# Patient Record
Sex: MALE | Race: WHITE | NOT HISPANIC OR LATINO | Employment: OTHER | URBAN - METROPOLITAN AREA
[De-identification: names, ages, dates, MRNs, and addresses within clinical notes are randomized per-mention and may not be internally consistent; named-entity substitution may affect disease eponyms.]

---

## 2017-01-06 ENCOUNTER — APPOINTMENT (OUTPATIENT)
Dept: SLEEP MEDICINE | Facility: HOSPITAL | Age: 72
End: 2017-01-06

## 2017-01-11 RX ORDER — OMEPRAZOLE 40 MG/1
CAPSULE, DELAYED RELEASE ORAL
Qty: 15 CAPSULE | Refills: 0 | Status: SHIPPED | OUTPATIENT
Start: 2017-01-11 | End: 2017-06-15 | Stop reason: SDUPTHER

## 2017-01-27 ENCOUNTER — APPOINTMENT (OUTPATIENT)
Dept: SLEEP MEDICINE | Facility: HOSPITAL | Age: 72
End: 2017-01-27

## 2017-04-10 RX ORDER — FUROSEMIDE 40 MG/1
TABLET ORAL
Qty: 90 TABLET | Refills: 2 | Status: SHIPPED | OUTPATIENT
Start: 2017-04-10 | End: 2017-05-25 | Stop reason: SDUPTHER

## 2017-05-04 RX ORDER — METOPROLOL TARTRATE 50 MG/1
TABLET, FILM COATED ORAL
Qty: 270 TABLET | Refills: 0 | Status: SHIPPED | OUTPATIENT
Start: 2017-05-04 | End: 2017-05-11

## 2017-05-11 ENCOUNTER — OFFICE VISIT (OUTPATIENT)
Dept: CARDIOLOGY | Facility: CLINIC | Age: 72
End: 2017-05-11

## 2017-05-11 VITALS
HEIGHT: 70 IN | WEIGHT: 238 LBS | SYSTOLIC BLOOD PRESSURE: 138 MMHG | HEART RATE: 66 BPM | DIASTOLIC BLOOD PRESSURE: 80 MMHG | BODY MASS INDEX: 34.07 KG/M2 | RESPIRATION RATE: 16 BRPM

## 2017-05-11 DIAGNOSIS — R07.2 PRECORDIAL PAIN: ICD-10-CM

## 2017-05-11 DIAGNOSIS — I49.3 PVC (PREMATURE VENTRICULAR CONTRACTION): ICD-10-CM

## 2017-05-11 DIAGNOSIS — I48.0 PAROXYSMAL ATRIAL FIBRILLATION (HCC): ICD-10-CM

## 2017-05-11 DIAGNOSIS — E78.2 MIXED HYPERLIPIDEMIA: ICD-10-CM

## 2017-05-11 DIAGNOSIS — R06.09 DYSPNEA ON EXERTION: Primary | ICD-10-CM

## 2017-05-11 DIAGNOSIS — I48.3 TYPICAL ATRIAL FLUTTER (HCC): ICD-10-CM

## 2017-05-11 DIAGNOSIS — G47.33 OBSTRUCTIVE SLEEP APNEA: ICD-10-CM

## 2017-05-11 DIAGNOSIS — I10 BENIGN ESSENTIAL HTN: ICD-10-CM

## 2017-05-11 PROCEDURE — 93000 ELECTROCARDIOGRAM COMPLETE: CPT | Performed by: INTERNAL MEDICINE

## 2017-05-11 PROCEDURE — 99214 OFFICE O/P EST MOD 30 MIN: CPT | Performed by: INTERNAL MEDICINE

## 2017-05-11 RX ORDER — METOPROLOL SUCCINATE 50 MG/1
150 TABLET, EXTENDED RELEASE ORAL DAILY
Qty: 270 TABLET | Refills: 3 | Status: SHIPPED | OUTPATIENT
Start: 2017-05-11 | End: 2018-05-07 | Stop reason: SDUPTHER

## 2017-05-12 ENCOUNTER — LAB (OUTPATIENT)
Dept: LAB | Facility: HOSPITAL | Age: 72
End: 2017-05-12

## 2017-05-12 DIAGNOSIS — I48.3 TYPICAL ATRIAL FLUTTER (HCC): ICD-10-CM

## 2017-05-12 DIAGNOSIS — I48.0 PAROXYSMAL ATRIAL FIBRILLATION (HCC): ICD-10-CM

## 2017-05-12 DIAGNOSIS — R06.09 DYSPNEA ON EXERTION: ICD-10-CM

## 2017-05-12 DIAGNOSIS — I10 BENIGN ESSENTIAL HTN: ICD-10-CM

## 2017-05-12 DIAGNOSIS — I49.3 PVC (PREMATURE VENTRICULAR CONTRACTION): ICD-10-CM

## 2017-05-12 DIAGNOSIS — E78.2 MIXED HYPERLIPIDEMIA: ICD-10-CM

## 2017-05-12 LAB
ALBUMIN SERPL-MCNC: 3.8 G/DL (ref 3.5–5.2)
ALBUMIN/GLOB SERPL: 1.2 G/DL
ALP SERPL-CCNC: 113 U/L (ref 39–117)
ALT SERPL W P-5'-P-CCNC: 36 U/L (ref 1–41)
ANION GAP SERPL CALCULATED.3IONS-SCNC: 12.8 MMOL/L
AST SERPL-CCNC: 63 U/L (ref 1–40)
BILIRUB SERPL-MCNC: 0.7 MG/DL (ref 0.1–1.2)
BUN BLD-MCNC: 16 MG/DL (ref 8–23)
BUN/CREAT SERPL: 13 (ref 7–25)
CALCIUM SPEC-SCNC: 9 MG/DL (ref 8.6–10.5)
CHLORIDE SERPL-SCNC: 108 MMOL/L (ref 98–107)
CHOLEST SERPL-MCNC: 140 MG/DL (ref 0–200)
CO2 SERPL-SCNC: 27.2 MMOL/L (ref 22–29)
CREAT BLD-MCNC: 1.23 MG/DL (ref 0.76–1.27)
DEPRECATED RDW RBC AUTO: 45.9 FL (ref 37–54)
ERYTHROCYTE [DISTWIDTH] IN BLOOD BY AUTOMATED COUNT: 13.6 % (ref 11.5–14.5)
GFR SERPL CREATININE-BSD FRML MDRD: 58 ML/MIN/1.73
GLOBULIN UR ELPH-MCNC: 3.2 GM/DL
GLUCOSE BLD-MCNC: 147 MG/DL (ref 65–99)
HCT VFR BLD AUTO: 36.2 % (ref 40.4–52.2)
HDLC SERPL-MCNC: 33 MG/DL (ref 40–60)
HGB BLD-MCNC: 11.9 G/DL (ref 13.7–17.6)
LDLC SERPL CALC-MCNC: 68 MG/DL (ref 0–100)
LDLC/HDLC SERPL: 2.05 {RATIO}
MAGNESIUM SERPL-MCNC: 2 MG/DL (ref 1.6–2.4)
MCH RBC QN AUTO: 30.4 PG (ref 27–32.7)
MCHC RBC AUTO-ENTMCNC: 32.9 G/DL (ref 32.6–36.4)
MCV RBC AUTO: 92.3 FL (ref 79.8–96.2)
PLATELET # BLD AUTO: 170 10*3/MM3 (ref 140–500)
PMV BLD AUTO: 10.6 FL (ref 6–12)
POTASSIUM BLD-SCNC: 4 MMOL/L (ref 3.5–5.2)
PROT SERPL-MCNC: 7 G/DL (ref 6–8.5)
RBC # BLD AUTO: 3.92 10*6/MM3 (ref 4.6–6)
SODIUM BLD-SCNC: 148 MMOL/L (ref 136–145)
T-UPTAKE NFR SERPL: 0.94 TBI (ref 0.8–1.3)
T4 SERPL-MCNC: 5.97 MCG/DL (ref 4.5–11.7)
TRIGL SERPL-MCNC: 197 MG/DL (ref 0–150)
TSH SERPL DL<=0.05 MIU/L-ACNC: 1.76 MIU/ML (ref 0.27–4.2)
VLDLC SERPL-MCNC: 39.4 MG/DL (ref 5–40)
WBC NRBC COR # BLD: 6.68 10*3/MM3 (ref 4.5–10.7)

## 2017-05-12 PROCEDURE — 85027 COMPLETE CBC AUTOMATED: CPT

## 2017-05-12 PROCEDURE — 36415 COLL VENOUS BLD VENIPUNCTURE: CPT

## 2017-05-12 PROCEDURE — 84436 ASSAY OF TOTAL THYROXINE: CPT | Performed by: INTERNAL MEDICINE

## 2017-05-12 PROCEDURE — 80061 LIPID PANEL: CPT | Performed by: INTERNAL MEDICINE

## 2017-05-12 PROCEDURE — 84443 ASSAY THYROID STIM HORMONE: CPT | Performed by: INTERNAL MEDICINE

## 2017-05-12 PROCEDURE — 83735 ASSAY OF MAGNESIUM: CPT

## 2017-05-12 PROCEDURE — 84479 ASSAY OF THYROID (T3 OR T4): CPT | Performed by: INTERNAL MEDICINE

## 2017-05-12 PROCEDURE — 80053 COMPREHEN METABOLIC PANEL: CPT

## 2017-05-25 ENCOUNTER — TELEPHONE (OUTPATIENT)
Dept: CARDIOLOGY | Facility: CLINIC | Age: 72
End: 2017-05-25

## 2017-05-25 ENCOUNTER — HOSPITAL ENCOUNTER (OUTPATIENT)
Dept: CARDIOLOGY | Facility: HOSPITAL | Age: 72
Discharge: HOME | End: 2017-05-25
Attending: INTERNAL MEDICINE | Admitting: INTERNAL MEDICINE

## 2017-05-25 ENCOUNTER — HOSPITAL ENCOUNTER (OUTPATIENT)
Dept: CARDIOLOGY | Facility: HOSPITAL | Age: 72
Discharge: HOME | End: 2017-05-25
Attending: INTERNAL MEDICINE

## 2017-05-25 VITALS
HEART RATE: 74 BPM | WEIGHT: 238 LBS | HEIGHT: 70 IN | BODY MASS INDEX: 34.07 KG/M2 | SYSTOLIC BLOOD PRESSURE: 126 MMHG | OXYGEN SATURATION: 96 % | DIASTOLIC BLOOD PRESSURE: 60 MMHG

## 2017-05-25 DIAGNOSIS — R06.09 DYSPNEA ON EXERTION: ICD-10-CM

## 2017-05-25 DIAGNOSIS — R07.2 PRECORDIAL PAIN: ICD-10-CM

## 2017-05-25 DIAGNOSIS — I49.3 PVC (PREMATURE VENTRICULAR CONTRACTION): ICD-10-CM

## 2017-05-25 LAB
ASCENDING AORTA: 3.2 CM
BH CV ECHO MEAS - ACS: 1.8 CM
BH CV ECHO MEAS - AO MAX PG (FULL): 1.4 MMHG
BH CV ECHO MEAS - AO MAX PG: 9.6 MMHG
BH CV ECHO MEAS - AO MEAN PG (FULL): 1.3 MMHG
BH CV ECHO MEAS - AO MEAN PG: 6.5 MMHG
BH CV ECHO MEAS - AO ROOT AREA (BSA CORRECTED): 1.8
BH CV ECHO MEAS - AO ROOT AREA: 12.3 CM^2
BH CV ECHO MEAS - AO ROOT DIAM: 4 CM
BH CV ECHO MEAS - AO V2 MAX: 154.7 CM/SEC
BH CV ECHO MEAS - AO V2 MEAN: 123.8 CM/SEC
BH CV ECHO MEAS - AO V2 VTI: 36.2 CM
BH CV ECHO MEAS - AVA(I,A): 2.9 CM^2
BH CV ECHO MEAS - AVA(I,D): 2.9 CM^2
BH CV ECHO MEAS - AVA(V,A): 2.9 CM^2
BH CV ECHO MEAS - AVA(V,D): 2.9 CM^2
BH CV ECHO MEAS - BSA(HAYCOCK): 2.3 M^2
BH CV ECHO MEAS - BSA: 2.2 M^2
BH CV ECHO MEAS - BZI_BMI: 34.1 KILOGRAMS/M^2
BH CV ECHO MEAS - BZI_METRIC_HEIGHT: 177.8 CM
BH CV ECHO MEAS - BZI_METRIC_WEIGHT: 108 KG
BH CV ECHO MEAS - CONTRAST EF (2CH): 56.8 ML/M^2
BH CV ECHO MEAS - CONTRAST EF 4CH: 59.3 ML/M^2
BH CV ECHO MEAS - EDV(MOD-SP2): 95 ML
BH CV ECHO MEAS - EDV(MOD-SP4): 91 ML
BH CV ECHO MEAS - EDV(TEICH): 93.3 ML
BH CV ECHO MEAS - EF(CUBED): 75.6 %
BH CV ECHO MEAS - EF(MOD-SP2): 56.8 %
BH CV ECHO MEAS - EF(MOD-SP4): 59.3 %
BH CV ECHO MEAS - EF(TEICH): 67.7 %
BH CV ECHO MEAS - ESV(MOD-SP2): 41 ML
BH CV ECHO MEAS - ESV(MOD-SP4): 37 ML
BH CV ECHO MEAS - ESV(TEICH): 30.2 ML
BH CV ECHO MEAS - FS: 37.5 %
BH CV ECHO MEAS - IVS/LVPW: 1
BH CV ECHO MEAS - IVSD: 1.2 CM
BH CV ECHO MEAS - LAT PEAK E' VEL: 7 CM/SEC
BH CV ECHO MEAS - LV DIASTOLIC VOL/BSA (35-75): 40.5 ML/M^2
BH CV ECHO MEAS - LV MASS(C)D: 197.9 GRAMS
BH CV ECHO MEAS - LV MASS(C)DI: 88.1 GRAMS/M^2
BH CV ECHO MEAS - LV MAX PG: 8.1 MMHG
BH CV ECHO MEAS - LV MEAN PG: 5.2 MMHG
BH CV ECHO MEAS - LV SYSTOLIC VOL/BSA (12-30): 16.5 ML/M^2
BH CV ECHO MEAS - LV V1 MAX: 142.6 CM/SEC
BH CV ECHO MEAS - LV V1 MEAN: 110.9 CM/SEC
BH CV ECHO MEAS - LV V1 VTI: 33.2 CM
BH CV ECHO MEAS - LVIDD: 4.5 CM
BH CV ECHO MEAS - LVIDS: 2.8 CM
BH CV ECHO MEAS - LVLD AP2: 8.6 CM
BH CV ECHO MEAS - LVLD AP4: 8.4 CM
BH CV ECHO MEAS - LVLS AP2: 8.2 CM
BH CV ECHO MEAS - LVLS AP4: 7.4 CM
BH CV ECHO MEAS - LVOT AREA (M): 3.1 CM^2
BH CV ECHO MEAS - LVOT AREA: 3.1 CM^2
BH CV ECHO MEAS - LVOT DIAM: 2 CM
BH CV ECHO MEAS - LVPWD: 1.2 CM
BH CV ECHO MEAS - MED PEAK E' VEL: 7 CM/SEC
BH CV ECHO MEAS - MR MAX PG: 43.2 MMHG
BH CV ECHO MEAS - MR MAX VEL: 328.5 CM/SEC
BH CV ECHO MEAS - MV A DUR: 0.11 SEC
BH CV ECHO MEAS - MV A MAX VEL: 101.4 CM/SEC
BH CV ECHO MEAS - MV DEC SLOPE: 233.4 CM/SEC^2
BH CV ECHO MEAS - MV DEC TIME: 0.29 SEC
BH CV ECHO MEAS - MV E MAX VEL: 67.9 CM/SEC
BH CV ECHO MEAS - MV E/A: 0.67
BH CV ECHO MEAS - MV MAX PG: 3.8 MMHG
BH CV ECHO MEAS - MV MEAN PG: 1.6 MMHG
BH CV ECHO MEAS - MV P1/2T MAX VEL: 69.4 CM/SEC
BH CV ECHO MEAS - MV P1/2T: 87.1 MSEC
BH CV ECHO MEAS - MV V2 MAX: 97.7 CM/SEC
BH CV ECHO MEAS - MV V2 MEAN: 59.7 CM/SEC
BH CV ECHO MEAS - MV V2 VTI: 30.3 CM
BH CV ECHO MEAS - MVA P1/2T LCG: 3.2 CM^2
BH CV ECHO MEAS - MVA(P1/2T): 2.5 CM^2
BH CV ECHO MEAS - MVA(VTI): 3.4 CM^2
BH CV ECHO MEAS - PA ACC TIME: 0.08 SEC
BH CV ECHO MEAS - PA MAX PG (FULL): 3.7 MMHG
BH CV ECHO MEAS - PA MAX PG: 5.7 MMHG
BH CV ECHO MEAS - PA PR(ACCEL): 42.6 MMHG
BH CV ECHO MEAS - PA V2 MAX: 119.4 CM/SEC
BH CV ECHO MEAS - PULM A REVS DUR: 0.11 SEC
BH CV ECHO MEAS - PULM A REVS VEL: 28.8 CM/SEC
BH CV ECHO MEAS - PULM DIAS VEL: 39.2 CM/SEC
BH CV ECHO MEAS - PULM S/D: 1.2
BH CV ECHO MEAS - PULM SYS VEL: 46.2 CM/SEC
BH CV ECHO MEAS - PVA(V,A): 2.2 CM^2
BH CV ECHO MEAS - PVA(V,D): 2.2 CM^2
BH CV ECHO MEAS - QP/QS: 0.69
BH CV ECHO MEAS - RAP SYSTOLE: 3 MMHG
BH CV ECHO MEAS - RV MAX PG: 2 MMHG
BH CV ECHO MEAS - RV MEAN PG: 1.2 MMHG
BH CV ECHO MEAS - RV V1 MAX: 70.8 CM/SEC
BH CV ECHO MEAS - RV V1 MEAN: 52 CM/SEC
BH CV ECHO MEAS - RV V1 VTI: 19 CM
BH CV ECHO MEAS - RVOT AREA: 3.7 CM^2
BH CV ECHO MEAS - RVOT DIAM: 2.2 CM
BH CV ECHO MEAS - RVSP: 29.9 MMHG
BH CV ECHO MEAS - SI(AO): 197.5 ML/M^2
BH CV ECHO MEAS - SI(CUBED): 31 ML/M^2
BH CV ECHO MEAS - SI(LVOT): 46 ML/M^2
BH CV ECHO MEAS - SI(MOD-SP2): 24 ML/M^2
BH CV ECHO MEAS - SI(MOD-SP4): 24 ML/M^2
BH CV ECHO MEAS - SI(TEICH): 28.1 ML/M^2
BH CV ECHO MEAS - SUP REN AO DIAM: 1.7 CM
BH CV ECHO MEAS - SV(AO): 443.9 ML
BH CV ECHO MEAS - SV(CUBED): 69.7 ML
BH CV ECHO MEAS - SV(LVOT): 103.5 ML
BH CV ECHO MEAS - SV(MOD-SP2): 54 ML
BH CV ECHO MEAS - SV(MOD-SP4): 54 ML
BH CV ECHO MEAS - SV(RVOT): 71.4 ML
BH CV ECHO MEAS - SV(TEICH): 63.1 ML
BH CV ECHO MEAS - TAPSE (>1.6): 2.5 CM2
BH CV ECHO MEAS - TR MAX VEL: 259.3 CM/SEC
BH CV NUCLEAR PRIOR STUDY: 3
BH CV STRESS BP STAGE 1: NORMAL
BH CV STRESS DURATION MIN STAGE 1: 3
BH CV STRESS DURATION MIN STAGE 2: 1
BH CV STRESS DURATION SEC STAGE 1: 0
BH CV STRESS DURATION SEC STAGE 2: 45
BH CV STRESS GRADE STAGE 1: 10
BH CV STRESS GRADE STAGE 2: 12
BH CV STRESS HR STAGE 1: 99
BH CV STRESS HR STAGE 2: 110
BH CV STRESS METS STAGE 1: 5
BH CV STRESS METS STAGE 2: 7.5
BH CV STRESS PROTOCOL 1: NORMAL
BH CV STRESS PROTOCOL 2 BP STAGE 1: NORMAL
BH CV STRESS PROTOCOL 2 COMMENTS STAGE 1: NORMAL
BH CV STRESS PROTOCOL 2 DOSE REGADENOSON STAGE 1: 0.4
BH CV STRESS PROTOCOL 2 DURATION MIN STAGE 1: 0
BH CV STRESS PROTOCOL 2 DURATION SEC STAGE 1: 15
BH CV STRESS PROTOCOL 2 HR STAGE 1: 117
BH CV STRESS PROTOCOL 2 STAGE 1: 1
BH CV STRESS PROTOCOL 2: NORMAL
BH CV STRESS RECOVERY BP: NORMAL MMHG
BH CV STRESS RECOVERY HR: 90 BPM
BH CV STRESS SPEED STAGE 1: 1.7
BH CV STRESS SPEED STAGE 2: 2.5
BH CV STRESS STAGE 1: 1
BH CV STRESS STAGE 2: 2
BH CV XLRA - RV BASE: 3 CM
BH CV XLRA - TDI S': 22 CM/SEC
E/E' RATIO: 10
LEFT ATRIUM VOLUME INDEX: 28 ML/M2
LV EF NUC BP: 67 %
MAXIMAL PREDICTED HEART RATE: 150 BPM
PERCENT MAX PREDICTED HR: 78 %
SINUS: 3.4 CM
STJ: 2.9 CM
STRESS BASELINE BP: NORMAL MMHG
STRESS BASELINE HR: 66 BPM
STRESS PERCENT HR: 92 %
STRESS POST PEAK BP: NORMAL MMHG
STRESS POST PEAK HR: 117 BPM
STRESS TARGET HR: 128 BPM

## 2017-05-25 PROCEDURE — 25010000002 PERFLUTREN (DEFINITY) 8.476 MG IN SODIUM CHLORIDE 10 ML INJECTION: Performed by: INTERNAL MEDICINE

## 2017-05-25 PROCEDURE — A9502 TC99M TETROFOSMIN: HCPCS | Performed by: INTERNAL MEDICINE

## 2017-05-25 PROCEDURE — 0 TECHNETIUM TETROFOSMIN KIT: Performed by: INTERNAL MEDICINE

## 2017-05-25 PROCEDURE — 93306 TTE W/DOPPLER COMPLETE: CPT | Performed by: INTERNAL MEDICINE

## 2017-05-25 PROCEDURE — 78452 HT MUSCLE IMAGE SPECT MULT: CPT

## 2017-05-25 PROCEDURE — 93016 CV STRESS TEST SUPVJ ONLY: CPT | Performed by: INTERNAL MEDICINE

## 2017-05-25 PROCEDURE — C8929 TTE W OR WO FOL WCON,DOPPLER: HCPCS

## 2017-05-25 PROCEDURE — 78452 HT MUSCLE IMAGE SPECT MULT: CPT | Performed by: INTERNAL MEDICINE

## 2017-05-25 PROCEDURE — 93017 CV STRESS TEST TRACING ONLY: CPT

## 2017-05-25 PROCEDURE — 25010000002 REGADENOSON 0.4 MG/5ML SOLUTION: Performed by: INTERNAL MEDICINE

## 2017-05-25 PROCEDURE — 93018 CV STRESS TEST I&R ONLY: CPT | Performed by: INTERNAL MEDICINE

## 2017-05-25 RX ADMIN — REGADENOSON 0.4 MG: 0.08 INJECTION, SOLUTION INTRAVENOUS at 09:35

## 2017-05-25 RX ADMIN — TETROFOSMIN 1 DOSE: 1.38 INJECTION, POWDER, LYOPHILIZED, FOR SOLUTION INTRAVENOUS at 08:30

## 2017-05-25 RX ADMIN — TETROFOSMIN 1 DOSE: 1.38 INJECTION, POWDER, LYOPHILIZED, FOR SOLUTION INTRAVENOUS at 09:35

## 2017-05-25 RX ADMIN — PERFLUTREN 1.5 ML: 6.52 INJECTION, SUSPENSION INTRAVENOUS at 09:14

## 2017-06-05 RX ORDER — APIXABAN 5 MG/1
TABLET, FILM COATED ORAL
Qty: 180 TABLET | Refills: 2 | Status: SHIPPED | OUTPATIENT
Start: 2017-06-05 | End: 2017-06-16 | Stop reason: SDUPTHER

## 2017-06-12 RX ORDER — OMEPRAZOLE 40 MG/1
CAPSULE, DELAYED RELEASE ORAL
Qty: 15 CAPSULE | OUTPATIENT
Start: 2017-06-12

## 2017-06-13 ENCOUNTER — OFFICE VISIT (OUTPATIENT)
Dept: FAMILY MEDICINE CLINIC | Facility: CLINIC | Age: 72
End: 2017-06-13

## 2017-06-13 VITALS
WEIGHT: 236.8 LBS | OXYGEN SATURATION: 98 % | HEART RATE: 59 BPM | DIASTOLIC BLOOD PRESSURE: 80 MMHG | SYSTOLIC BLOOD PRESSURE: 142 MMHG | BODY MASS INDEX: 33.98 KG/M2

## 2017-06-13 DIAGNOSIS — K21.9 GASTROESOPHAGEAL REFLUX DISEASE WITHOUT ESOPHAGITIS: Primary | ICD-10-CM

## 2017-06-13 DIAGNOSIS — H61.23 BILATERAL IMPACTED CERUMEN: ICD-10-CM

## 2017-06-13 PROCEDURE — 99214 OFFICE O/P EST MOD 30 MIN: CPT | Performed by: NURSE PRACTITIONER

## 2017-06-13 NOTE — PROGRESS NOTES
Henri Santana is a 72 y.o. male.  Seen 06/13/2017    Assessment/Plan   Problem List Items Addressed This Visit     None             No Follow-up on file.  There are no Patient Instructions on file for this visit.    Vitals:    06/13/17 1005   BP: 142/80   Pulse: 59   SpO2: 98%   Weight: 236 lb 12.8 oz (107 kg)     Body mass index is 33.98 kg/(m^2).    Subjective     Chief Complaint   Patient presents with   • Med Refill     Omeparzole   • Ear Fullness     States that he went to [facility] a couple of weeks ago and they told him that he needed his ears cleaned out   Pt of Dr. Estrada and is here for a cerumen impaction.  Was seen at the [facility] a couple of weeks ago and told he couldn't have it cleaned out there and needed to go see his primary provider. He is having trouble hearing.    Also takes omeprazole for his gerd and is stable. He is requesting a refill.  Social History   Substance Use Topics   • Smoking status: Former Smoker     Packs/day: 1.00     Years: 30.00     Types: Cigarettes     Quit date: 1967   • Smokeless tobacco: Never Used   • Alcohol use Yes      Comment: social       History of Present Illness     The following portions of the patient's history were reviewed and updated as appropriate:PMHroutine: Social history , Allergies and Current Medications    Review of Systems   HENT: Positive for hearing loss. Negative for ear discharge and ear pain.         Ear fullness   Respiratory: Positive for cough.    All other systems reviewed and are negative.      Objective   Physical Exam   Constitutional: He appears well-developed and well-nourished. No distress.   HENT:   Bilateral cerumen impaction   Eyes: EOM are normal.   Pulmonary/Chest: Effort normal.   Musculoskeletal: Normal range of motion.   Neurological: He is alert.   Skin: Skin is warm.   Nursing note and vitals reviewed.    Reviewed Data:  Hospital Outpatient Visit on 05/25/2017   Component Date Value Ref Range Status   • Nuclear Prior Study 05/25/2017 3    Final   • Target HR (85%) 05/25/2017 128  bpm Final   • Max. Pred. HR (100%) 05/25/2017 150  bpm Final   • BH CV STRESS PROTOCOL 1 05/25/2017 Alex   Final   • Stage 1 05/25/2017 1   Final   • HR Stage 1 05/25/2017 99   Final   • BP Stage 1 05/25/2017 130/60   Final   • Duration Min Stage 1 05/25/2017 3   Final   • Duration Sec Stage 1 05/25/2017 0   Final   • Grade Stage 1 05/25/2017 10   Final   • Speed Stage 1 05/25/2017 1.7   Final   • BH CV STRESS METS STAGE 1 05/25/2017 5   Final   • Stage 2 05/25/2017 2   Final   • HR Stage 2 05/25/2017 110   Final   • Duration Min Stage 2 05/25/2017 1   Final   • Duration Sec Stage 2 05/25/2017 45   Final   • Grade Stage 2 05/25/2017 12   Final   • Speed Stage 2 05/25/2017 2.5   Final   • BH CV STRESS METS STAGE 2 05/25/2017 7.5   Final   • Baseline HR 05/25/2017 66  bpm Final   • Baseline BP 05/25/2017 126/52  mmHg Final   • Peak HR 05/25/2017 117  bpm Final   • Percent Max Pred HR 05/25/2017 78.00  % Final   • Percent Target HR 05/25/2017 92  % Final   • Peak BP 05/25/2017 148/76  mmHg Final   • Recovery HR 05/25/2017 90  bpm Final   • Recovery BP 05/25/2017 150/64  mmHg Final   • BH CV STRESS PROTOCOL 2 05/25/2017 Pharmacologic   Final   • BH CV STRESS PROTOCOL 2 STAGE 1 05/25/2017 1   Final   • BH CV STRESS PROTOCOL 2 HR STAGE 1 05/25/2017 117   Final   • BH CV STRESS PROTOCOL 2 BP STAGE 1 05/25/2017 148/76   Final   • BH CV STRESS PROTOCOL 2 DURATION M* 05/25/2017 0   Final   • BH CV STRESS PROTOCOL 2 DURATION S* 05/25/2017 15   Final   • BH CV STRESS PROTOCOL 2 DOSE REGAD* 05/25/2017 0.4   Final   • BH CV STRESS PROTOCOL 2 COMMENTS S* 05/25/2017 15 sec bolus injection   Final   • Nuc Stress EF 05/25/2017 67  % Final   Hospital Outpatient Visit on 05/25/2017   Component Date Value Ref Range Status   • BSA 05/25/2017 2.2  m^2 Preliminary   • IVSd 05/25/2017 1.2  cm Preliminary   • LVIDd 05/25/2017 4.5  cm Preliminary   • LVIDs 05/25/2017 2.8  cm Preliminary   • LVPWd  05/25/2017 1.2  cm Preliminary   • IVS/LVPW 05/25/2017 1.0   Preliminary   • FS 05/25/2017 37.5  % Preliminary   • EDV(Teich) 05/25/2017 93.3  ml Preliminary   • ESV(Teich) 05/25/2017 30.2  ml Preliminary   • EF(Teich) 05/25/2017 67.7  % Preliminary   • EF(cubed) 05/25/2017 75.6  % Preliminary   • LV mass(C)d 05/25/2017 197.9  grams Preliminary   • LV mass(C)dI 05/25/2017 88.1  grams/m^2 Preliminary   • SV(Teich) 05/25/2017 63.1  ml Preliminary   • SI(Teich) 05/25/2017 28.1  ml/m^2 Preliminary   • SV(cubed) 05/25/2017 69.7  ml Preliminary   • SI(cubed) 05/25/2017 31.0  ml/m^2 Preliminary   • Ao root diam 05/25/2017 4.0  cm Preliminary   • Ao root area 05/25/2017 12.3  cm^2 Preliminary   • ACS 05/25/2017 1.8  cm Preliminary   • LVOT diam 05/25/2017 2.0  cm Preliminary   • LVOT area 05/25/2017 3.1  cm^2 Preliminary   • LVOT area(traced) 05/25/2017 3.1  cm^2 Preliminary   • RVOT diam 05/25/2017 2.2  cm Preliminary   • RVOT area 05/25/2017 3.7  cm^2 Preliminary   • LVLd ap4 05/25/2017 8.4  cm Preliminary   • EDV(MOD-sp4) 05/25/2017 91.0  ml Preliminary   • LVLs ap4 05/25/2017 7.4  cm Preliminary   • ESV(MOD-sp4) 05/25/2017 37.0  ml Preliminary   • EF(MOD-sp4) 05/25/2017 59.3  % Preliminary   • LVLd ap2 05/25/2017 8.6  cm Preliminary   • EDV(MOD-sp2) 05/25/2017 95.0  ml Preliminary   • LVLs ap2 05/25/2017 8.2  cm Preliminary   • ESV(MOD-sp2) 05/25/2017 41.0  ml Preliminary   • EF(MOD-sp2) 05/25/2017 56.8  % Preliminary   • SV(MOD-sp4) 05/25/2017 54.0  ml Preliminary   • SI(MOD-sp4) 05/25/2017 24.0  ml/m^2 Preliminary   • SV(MOD-sp2) 05/25/2017 54.0  ml Preliminary   • SI(MOD-sp2) 05/25/2017 24.0  ml/m^2 Preliminary   • Ao root area (BSA corrected) 05/25/2017 1.8   Preliminary   • Ao root area (BSA corrected) 05/25/2017 56.8  ml/m^2 Preliminary   • CONTRAST EF 4CH 05/25/2017 59.3  ml/m^2 Preliminary   • LV Diastolic corrected for BSA 05/25/2017 40.5  ml/m^2 Preliminary   • LV Systolic corrected for BSA 05/25/2017 16.5   ml/m^2 Preliminary   • MV A dur 05/25/2017 0.11  sec Preliminary   • MV E max horace 05/25/2017 67.9  cm/sec Preliminary   • MV A max horace 05/25/2017 101.4  cm/sec Preliminary   • MV E/A 05/25/2017 0.67   Preliminary   • MV V2 max 05/25/2017 97.7  cm/sec Preliminary   • MV max PG 05/25/2017 3.8  mmHg Preliminary   • MV V2 mean 05/25/2017 59.7  cm/sec Preliminary   • MV mean PG 05/25/2017 1.6  mmHg Preliminary   • MV V2 VTI 05/25/2017 30.3  cm Preliminary   • MVA(VTI) 05/25/2017 3.4  cm^2 Preliminary   • MV P1/2t max horace 05/25/2017 69.4  cm/sec Preliminary   • MV P1/2t 05/25/2017 87.1  msec Preliminary   • MVA(P1/2t) 05/25/2017 2.5  cm^2 Preliminary   • MV dec slope 05/25/2017 233.4  cm/sec^2 Preliminary   • MV dec time 05/25/2017 0.29  sec Preliminary   • Ao pk horace 05/25/2017 154.7  cm/sec Preliminary   • Ao max PG 05/25/2017 9.6  mmHg Preliminary   • Ao max PG (full) 05/25/2017 1.4  mmHg Preliminary   • Ao V2 mean 05/25/2017 123.8  cm/sec Preliminary   • Ao mean PG 05/25/2017 6.5  mmHg Preliminary   • Ao mean PG (full) 05/25/2017 1.3  mmHg Preliminary   • Ao V2 VTI 05/25/2017 36.2  cm Preliminary   • VINNIE(I,A) 05/25/2017 2.9  cm^2 Preliminary   • VINNIE(I,D) 05/25/2017 2.9  cm^2 Preliminary   • VINNIE(V,A) 05/25/2017 2.9  cm^2 Preliminary   • IVNNIE(V,D) 05/25/2017 2.9  cm^2 Preliminary   • LV V1 max PG 05/25/2017 8.1  mmHg Preliminary   • LV V1 mean PG 05/25/2017 5.2  mmHg Preliminary   • LV V1 max 05/25/2017 142.6  cm/sec Preliminary   • LV V1 mean 05/25/2017 110.9  cm/sec Preliminary   • LV V1 VTI 05/25/2017 33.2  cm Preliminary   • MR max horace 05/25/2017 328.5  cm/sec Preliminary   • MR max PG 05/25/2017 43.2  mmHg Preliminary   • SV(Ao) 05/25/2017 443.9  ml Preliminary   • SI(Ao) 05/25/2017 197.5  ml/m^2 Preliminary   • SV(LVOT) 05/25/2017 103.5  ml Preliminary   • SV(RVOT) 05/25/2017 71.4  ml Preliminary   • SI(LVOT) 05/25/2017 46.0  ml/m^2 Preliminary   • PA V2 max 05/25/2017 119.4  cm/sec Preliminary   • PA max PG  05/25/2017 5.7  mmHg Preliminary   • PA max PG (full) 05/25/2017 3.7  mmHg Preliminary   • BH CV ECHO JACOBY - PVA(V,A) 05/25/2017 2.2  cm^2 Preliminary   • BH CV ECHO JACOBY - PVA(V,D) 05/25/2017 2.2  cm^2 Preliminary   • PA acc time 05/25/2017 0.08  sec Preliminary   • RV V1 max PG 05/25/2017 2.0  mmHg Preliminary   • RV V1 mean PG 05/25/2017 1.2  mmHg Preliminary   • RV V1 max 05/25/2017 70.8  cm/sec Preliminary   • RV V1 mean 05/25/2017 52.0  cm/sec Preliminary   • RV V1 VTI 05/25/2017 19.0  cm Preliminary   • TR max dada 05/25/2017 259.3  cm/sec Preliminary   • RVSP(TR) 05/25/2017 29.9  mmHg Preliminary   • RAP systole 05/25/2017 3.0  mmHg Preliminary   • PA pr(Accel) 05/25/2017 42.6  mmHg Preliminary   • Pulm Sys Dada 05/25/2017 46.2  cm/sec Preliminary   • Pulm Quiroz Dada 05/25/2017 39.2  cm/sec Preliminary   • Pulm S/D 05/25/2017 1.2   Preliminary   • Qp/Qs 05/25/2017 0.69   Preliminary   • Pulm A Revs Dur 05/25/2017 0.11  sec Preliminary   • Pulm A Revs Dada 05/25/2017 28.8  cm/sec Preliminary   • MVA P1/2T LCG 05/25/2017 3.2  cm^2 Preliminary   • BH CV ECHO JACOBY - BZI_BMI 05/25/2017 34.1  kilograms/m^2 Preliminary   • BH CV ECHO JACOBY - BSA(HAYCOCK) 05/25/2017 2.3  m^2 Preliminary   • BH CV ECHO JACOBY - BZI_METRIC_WEIGHT 05/25/2017 108.0  kg Preliminary   • BH CV ECHO JACOBY - BZI_METRIC_HEIGHT 05/25/2017 177.8  cm Preliminary   • TDI S' 05/25/2017 22.00  cm/sec Final   • RV Base 05/25/2017 3.00  cm Final   • Sinus 05/25/2017 3.40  cm Final   • STJ 05/25/2017 2.90  cm Final   • Ascending aorta 05/25/2017 3.20  cm Final   • E/E' ratio 05/25/2017 10.0   Final   • LA Volume Index 05/25/2017 28.0  mL/m2 Final   • Lat Peak E' Dada 05/25/2017 7.0  cm/sec Final   • Med Peak E' Dada 05/25/2017 7.00  cm/sec Final   • Sup Roly Ao Diam 05/25/2017 1.70  cm Final   • TAPSE (>1.6) 05/25/2017 2.50  cm2 Final       S/p ear irrigation rt ear clear, is going to return for another ear irrigation in a couple of days after using a couple days  of ear drops to loosen the cerumen.

## 2017-06-15 ENCOUNTER — CLINICAL SUPPORT (OUTPATIENT)
Dept: FAMILY MEDICINE CLINIC | Facility: CLINIC | Age: 72
End: 2017-06-15

## 2017-06-15 ENCOUNTER — TELEPHONE (OUTPATIENT)
Dept: FAMILY MEDICINE CLINIC | Facility: CLINIC | Age: 72
End: 2017-06-15

## 2017-06-15 DIAGNOSIS — H61.22 IMPACTED CERUMEN, LEFT EAR: ICD-10-CM

## 2017-06-15 PROCEDURE — 69209 REMOVE IMPACTED EAR WAX UNI: CPT | Performed by: NURSE PRACTITIONER

## 2017-06-15 RX ORDER — OMEPRAZOLE 40 MG/1
40 CAPSULE, DELAYED RELEASE ORAL DAILY
Qty: 90 CAPSULE | Refills: 1 | Status: SHIPPED | OUTPATIENT
Start: 2017-06-15 | End: 2017-06-15 | Stop reason: SDUPTHER

## 2017-06-15 RX ORDER — OMEPRAZOLE 40 MG/1
40 CAPSULE, DELAYED RELEASE ORAL DAILY
Qty: 90 CAPSULE | Refills: 1 | Status: SHIPPED | OUTPATIENT
Start: 2017-06-15 | End: 2017-12-20 | Stop reason: SDUPTHER

## 2017-06-15 NOTE — TELEPHONE ENCOUNTER
Pt's omeprazole was canceled. Pt was told prior to making appt for ear cleaning that he needed to be seen by Dr. Estrada for medication refills. Pt said he would make appointment and has not done so. Pt has not been seen since 2015.

## 2017-06-16 ENCOUNTER — TELEPHONE (OUTPATIENT)
Dept: CARDIOLOGY | Facility: CLINIC | Age: 72
End: 2017-06-16

## 2017-06-16 NOTE — TELEPHONE ENCOUNTER
Pt called and said that [facility] was denying his eliquis. I spoke with the pharmacy/[facility], and the diagnosis was missing from the ePA, once that was answered it was approved.  [id number]  Back dated to 5- through 6/16/2018

## 2017-10-09 ENCOUNTER — OFFICE VISIT (OUTPATIENT)
Dept: FAMILY MEDICINE CLINIC | Facility: CLINIC | Age: 72
End: 2017-10-09

## 2017-10-09 VITALS
RESPIRATION RATE: 14 BRPM | TEMPERATURE: 98.3 F | SYSTOLIC BLOOD PRESSURE: 130 MMHG | HEART RATE: 64 BPM | OXYGEN SATURATION: 99 % | BODY MASS INDEX: 33.36 KG/M2 | DIASTOLIC BLOOD PRESSURE: 64 MMHG | WEIGHT: 233 LBS | HEIGHT: 70 IN

## 2017-10-09 DIAGNOSIS — I48.91 ATRIAL FIBRILLATION, UNSPECIFIED TYPE (HCC): ICD-10-CM

## 2017-10-09 DIAGNOSIS — E78.2 MIXED HYPERLIPIDEMIA: ICD-10-CM

## 2017-10-09 DIAGNOSIS — C61 CA OF PROSTATE (HCC): Primary | ICD-10-CM

## 2017-10-09 DIAGNOSIS — D64.9 ANEMIA, UNSPECIFIED TYPE: ICD-10-CM

## 2017-10-09 DIAGNOSIS — R73.9 BLOOD GLUCOSE ELEVATED: ICD-10-CM

## 2017-10-09 PROCEDURE — 99214 OFFICE O/P EST MOD 30 MIN: CPT | Performed by: FAMILY MEDICINE

## 2017-10-09 NOTE — PROGRESS NOTES
Subjective   Henri Santana is a 72 y.o. male.     Chief Complaint   Patient presents with   • Establish Care     Patient needs to establish a care.    • Elevated PSA     Patient needs to check on his PSA level and blood value.    • Nose Bleed     Patient gets bleeding left side of nose .        HPI     Patient is here to discuss some problems that are new to me.  Patient states he has history of prostate cancer, elevated blood glucose, hyperlipidemia, and anemia.  He is in need of blood work to evaluate these chronic problems.  He does have a past medical history of colon cancer and resection.  Patient did have a colectomy.  He currently is taking and tolerating Eliquis for A. fib.  He's also taking Lasix, omeprazole, metoprolol, and iron.    The following portions of the patient's history were reviewed and updated as appropriate: allergies, current medications, past family history, past medical history, past social history, past surgical history and problem list.    Review of Systems   Constitutional: Negative for activity change.   All other systems reviewed and are negative.      Objective  Vitals:    10/09/17 1236   BP: 130/64   Pulse: 64   Resp: 14   Temp: 98.3 °F (36.8 °C)   SpO2: 99%        Physical Exam   Constitutional: He is oriented to person, place, and time. He appears well-developed and well-nourished. No distress.   HENT:   Head: Normocephalic and atraumatic.   Right Ear: External ear normal.   Left Ear: External ear normal.   Nose: Nose normal.   Mouth/Throat: Oropharynx is clear and moist.   Eyes: Conjunctivae and EOM are normal. Pupils are equal, round, and reactive to light. Right eye exhibits no discharge. Left eye exhibits no discharge. No scleral icterus.   Neck: Normal range of motion. Neck supple.   Cardiovascular: Normal rate, regular rhythm and normal heart sounds.  Exam reveals no friction rub.    No murmur heard.  Pulmonary/Chest: Effort normal and breath sounds normal. No respiratory  distress. He has no wheezes. He has no rales.   Abdominal: Soft. Bowel sounds are normal. He exhibits no distension. There is no tenderness. There is no rebound and no guarding.   Lymphadenopathy:     He has no cervical adenopathy.   Neurological: He is alert and oriented to person, place, and time.   Skin: Skin is warm and dry. He is not diaphoretic.   Nursing note and vitals reviewed.        Current Outpatient Prescriptions:   •  apixaban (ELIQUIS) 5 MG tablet tablet, Take 1 tablet by mouth Every 12 (Twelve) Hours., Disp: 60 tablet, Rfl: 0  •  ferrous sulfate 325 (65 FE) MG tablet, TAKE 1 TABLET DAILY WITH BREAKFAST, Disp: 30 tablet, Rfl: 3  •  furosemide (LASIX) 40 MG tablet, Take 40 mg by mouth daily., Disp: , Rfl:   •  metoprolol succinate XL (TOPROL-XL) 50 MG 24 hr tablet, Take 3 tablets by mouth Daily., Disp: 270 tablet, Rfl: 3  •  omeprazole (priLOSEC) 40 MG capsule, Take 1 capsule by mouth Daily., Disp: 90 capsule, Rfl: 1  •  potassium gluconate (EQL POTASSIUM GLUCONATE) 595 MG tablet tablet, Take 595 mg by mouth daily., Disp: , Rfl:   •  simethicone (MYLICON) 80 MG chewable tablet, Chew 80 mg every 6 (six) hours as needed for flatulence., Disp: , Rfl:   •  albuterol (PROVENTIL HFA;VENTOLIN HFA) 108 (90 BASE) MCG/ACT inhaler, Inhale 2 puffs Every 4 (Four) Hours As Needed for wheezing., Disp: 1 inhaler, Rfl: 0  •  carbamide peroxide (DEBROX) 6.5 % otic solution, Administer 5 drops to the right ear 2 (Two) Times a Day., Disp: 22 mL, Rfl: 0    Procedures    Lab Results (most recent)     None          Assessment/Plan   Henri was seen today for establish care, elevated psa and nose bleed.    Diagnoses and all orders for this visit:    CA of prostate  -     PSA    Blood glucose elevated  -     Comprehensive Metabolic Panel  -     Hemoglobin A1c    Mixed hyperlipidemia  -     Lipid Panel    Anemia, unspecified type  -     CBC Auto Differential    Atrial fibrillation, unspecified type    Extensive conversation and  chart review done regarding the patient's past medical history.  We'll get labs as above.  We'll adjust treatment plan based on those results.      Return in about 3 months (around 1/9/2018) for Recheck.      Loki Diane MD

## 2017-10-10 LAB
ALBUMIN SERPL-MCNC: 4.4 G/DL (ref 3.5–5.2)
ALBUMIN/GLOB SERPL: 1.4 G/DL
ALP SERPL-CCNC: 126 U/L (ref 39–117)
ALT SERPL-CCNC: 36 U/L (ref 1–41)
AST SERPL-CCNC: 53 U/L (ref 1–40)
BASOPHILS # BLD AUTO: 0.05 10*3/MM3 (ref 0–0.2)
BASOPHILS NFR BLD AUTO: 0.5 % (ref 0–1.5)
BILIRUB SERPL-MCNC: 0.9 MG/DL (ref 0.1–1.2)
BUN SERPL-MCNC: 18 MG/DL (ref 8–23)
BUN/CREAT SERPL: 13.4 (ref 7–25)
CALCIUM SERPL-MCNC: 9.9 MG/DL (ref 8.6–10.5)
CHLORIDE SERPL-SCNC: 102 MMOL/L (ref 98–107)
CHOLEST SERPL-MCNC: 148 MG/DL (ref 0–200)
CO2 SERPL-SCNC: 25.5 MMOL/L (ref 22–29)
CREAT SERPL-MCNC: 1.34 MG/DL (ref 0.76–1.27)
EOSINOPHIL # BLD AUTO: 0.16 10*3/MM3 (ref 0–0.7)
EOSINOPHIL NFR BLD AUTO: 1.7 % (ref 0.3–6.2)
ERYTHROCYTE [DISTWIDTH] IN BLOOD BY AUTOMATED COUNT: 13.8 % (ref 11.5–14.5)
GLOBULIN SER CALC-MCNC: 3.2 GM/DL
GLUCOSE SERPL-MCNC: 122 MG/DL (ref 65–99)
HBA1C MFR BLD: 5.4 % (ref 4.8–5.6)
HCT VFR BLD AUTO: 36 % (ref 40.4–52.2)
HDLC SERPL-MCNC: 31 MG/DL (ref 40–60)
HGB BLD-MCNC: 11.5 G/DL (ref 13.7–17.6)
IMM GRANULOCYTES # BLD: 0.04 10*3/MM3 (ref 0–0.03)
IMM GRANULOCYTES NFR BLD: 0.4 % (ref 0–0.5)
LDLC SERPL CALC-MCNC: 48 MG/DL (ref 0–100)
LYMPHOCYTES # BLD AUTO: 1.76 10*3/MM3 (ref 0.9–4.8)
LYMPHOCYTES NFR BLD AUTO: 19 % (ref 19.6–45.3)
MCH RBC QN AUTO: 29.2 PG (ref 27–32.7)
MCHC RBC AUTO-ENTMCNC: 31.9 G/DL (ref 32.6–36.4)
MCV RBC AUTO: 91.4 FL (ref 79.8–96.2)
MONOCYTES # BLD AUTO: 0.67 10*3/MM3 (ref 0.2–1.2)
MONOCYTES NFR BLD AUTO: 7.3 % (ref 5–12)
NEUTROPHILS # BLD AUTO: 6.56 10*3/MM3 (ref 1.9–8.1)
NEUTROPHILS NFR BLD AUTO: 71.1 % (ref 42.7–76)
PLATELET # BLD AUTO: 258 10*3/MM3 (ref 140–500)
POTASSIUM SERPL-SCNC: 4 MMOL/L (ref 3.5–5.2)
PROT SERPL-MCNC: 7.6 G/DL (ref 6–8.5)
PSA SERPL-MCNC: <0.014 NG/ML (ref 0–4)
RBC # BLD AUTO: 3.94 10*6/MM3 (ref 4.6–6)
SODIUM SERPL-SCNC: 142 MMOL/L (ref 136–145)
TRIGL SERPL-MCNC: 347 MG/DL (ref 0–150)
VLDLC SERPL CALC-MCNC: 69.4 MG/DL (ref 5–40)
WBC # BLD AUTO: 9.24 10*3/MM3 (ref 4.5–10.7)

## 2017-12-20 RX ORDER — OMEPRAZOLE 40 MG/1
40 CAPSULE, DELAYED RELEASE ORAL DAILY
Qty: 90 CAPSULE | Refills: 3 | Status: SHIPPED | OUTPATIENT
Start: 2017-12-20 | End: 2018-12-17 | Stop reason: SDUPTHER

## 2017-12-20 RX ORDER — OMEPRAZOLE 40 MG/1
CAPSULE, DELAYED RELEASE ORAL
Qty: 90 CAPSULE | Refills: 1 | OUTPATIENT
Start: 2017-12-20

## 2018-01-05 RX ORDER — FUROSEMIDE 40 MG/1
TABLET ORAL
Qty: 90 TABLET | Refills: 2 | Status: SHIPPED | OUTPATIENT
Start: 2018-01-05 | End: 2018-10-02 | Stop reason: SDUPTHER

## 2018-01-09 ENCOUNTER — OFFICE VISIT (OUTPATIENT)
Dept: FAMILY MEDICINE CLINIC | Facility: CLINIC | Age: 73
End: 2018-01-09

## 2018-01-09 VITALS
DIASTOLIC BLOOD PRESSURE: 68 MMHG | TEMPERATURE: 98.3 F | OXYGEN SATURATION: 97 % | BODY MASS INDEX: 33.86 KG/M2 | SYSTOLIC BLOOD PRESSURE: 128 MMHG | HEIGHT: 70 IN | HEART RATE: 75 BPM | RESPIRATION RATE: 14 BRPM | WEIGHT: 236.5 LBS

## 2018-01-09 DIAGNOSIS — R73.9 BLOOD GLUCOSE ELEVATED: ICD-10-CM

## 2018-01-09 DIAGNOSIS — D64.9 ANEMIA, UNSPECIFIED TYPE: Primary | ICD-10-CM

## 2018-01-09 DIAGNOSIS — R79.89 ELEVATED SERUM CREATININE: ICD-10-CM

## 2018-01-09 PROCEDURE — 99214 OFFICE O/P EST MOD 30 MIN: CPT | Performed by: FAMILY MEDICINE

## 2018-01-10 LAB
ALBUMIN SERPL-MCNC: 4.2 G/DL (ref 3.5–5.2)
ALBUMIN/GLOB SERPL: 1.4 G/DL
ALP SERPL-CCNC: 132 U/L (ref 39–117)
ALT SERPL-CCNC: 33 U/L (ref 1–41)
AST SERPL-CCNC: 47 U/L (ref 1–40)
BASOPHILS # BLD AUTO: 0.05 10*3/MM3 (ref 0–0.2)
BASOPHILS NFR BLD AUTO: 0.6 % (ref 0–1.5)
BILIRUB SERPL-MCNC: 0.8 MG/DL (ref 0.1–1.2)
BUN SERPL-MCNC: 13 MG/DL (ref 8–23)
BUN/CREAT SERPL: 11.9 (ref 7–25)
CALCIUM SERPL-MCNC: 9.4 MG/DL (ref 8.6–10.5)
CHLORIDE SERPL-SCNC: 102 MMOL/L (ref 98–107)
CO2 SERPL-SCNC: 28 MMOL/L (ref 22–29)
CREAT SERPL-MCNC: 1.09 MG/DL (ref 0.76–1.27)
EOSINOPHIL # BLD AUTO: 0.13 10*3/MM3 (ref 0–0.7)
EOSINOPHIL NFR BLD AUTO: 1.6 % (ref 0.3–6.2)
ERYTHROCYTE [DISTWIDTH] IN BLOOD BY AUTOMATED COUNT: 14.2 % (ref 11.5–14.5)
GLOBULIN SER CALC-MCNC: 3 GM/DL
GLUCOSE SERPL-MCNC: 131 MG/DL (ref 65–99)
HCT VFR BLD AUTO: 37.1 % (ref 40.4–52.2)
HGB BLD-MCNC: 11.7 G/DL (ref 13.7–17.6)
IMM GRANULOCYTES # BLD: 0 10*3/MM3 (ref 0–0.03)
IMM GRANULOCYTES NFR BLD: 0 % (ref 0–0.5)
LYMPHOCYTES # BLD AUTO: 1.45 10*3/MM3 (ref 0.9–4.8)
LYMPHOCYTES NFR BLD AUTO: 17.5 % (ref 19.6–45.3)
MCH RBC QN AUTO: 29.3 PG (ref 27–32.7)
MCHC RBC AUTO-ENTMCNC: 31.5 G/DL (ref 32.6–36.4)
MCV RBC AUTO: 93 FL (ref 79.8–96.2)
MONOCYTES # BLD AUTO: 0.57 10*3/MM3 (ref 0.2–1.2)
MONOCYTES NFR BLD AUTO: 6.9 % (ref 5–12)
NEUTROPHILS # BLD AUTO: 6.08 10*3/MM3 (ref 1.9–8.1)
NEUTROPHILS NFR BLD AUTO: 73.4 % (ref 42.7–76)
PLATELET # BLD AUTO: 213 10*3/MM3 (ref 140–500)
POTASSIUM SERPL-SCNC: 4.1 MMOL/L (ref 3.5–5.2)
PROT SERPL-MCNC: 7.2 G/DL (ref 6–8.5)
RBC # BLD AUTO: 3.99 10*6/MM3 (ref 4.6–6)
SODIUM SERPL-SCNC: 143 MMOL/L (ref 136–145)
WBC # BLD AUTO: 8.28 10*3/MM3 (ref 4.5–10.7)

## 2018-03-15 ENCOUNTER — OFFICE VISIT (OUTPATIENT)
Dept: FAMILY MEDICINE CLINIC | Facility: CLINIC | Age: 73
End: 2018-03-15

## 2018-03-15 VITALS
BODY MASS INDEX: 33.39 KG/M2 | SYSTOLIC BLOOD PRESSURE: 130 MMHG | HEIGHT: 70 IN | TEMPERATURE: 98.3 F | RESPIRATION RATE: 16 BRPM | OXYGEN SATURATION: 100 % | DIASTOLIC BLOOD PRESSURE: 68 MMHG | WEIGHT: 233.2 LBS | HEART RATE: 58 BPM

## 2018-03-15 DIAGNOSIS — D50.9 IRON DEFICIENCY ANEMIA, UNSPECIFIED IRON DEFICIENCY ANEMIA TYPE: ICD-10-CM

## 2018-03-15 DIAGNOSIS — R73.9 BLOOD GLUCOSE ELEVATED: ICD-10-CM

## 2018-03-15 DIAGNOSIS — E78.01 FAMILIAL HYPERCHOLESTEROLEMIA: ICD-10-CM

## 2018-03-15 DIAGNOSIS — H61.23 BILATERAL HEARING LOSS DUE TO CERUMEN IMPACTION: ICD-10-CM

## 2018-03-15 DIAGNOSIS — I10 BENIGN ESSENTIAL HTN: Primary | ICD-10-CM

## 2018-03-15 LAB
ALBUMIN SERPL-MCNC: 4.3 G/DL (ref 3.5–5.2)
ALBUMIN/GLOB SERPL: 1.6 G/DL
ALP SERPL-CCNC: 108 U/L (ref 39–117)
ALT SERPL-CCNC: 27 U/L (ref 1–41)
AST SERPL-CCNC: 38 U/L (ref 1–40)
BASOPHILS # BLD AUTO: 0.04 10*3/MM3 (ref 0–0.2)
BASOPHILS NFR BLD AUTO: 0.6 % (ref 0–1.5)
BILIRUB SERPL-MCNC: 0.9 MG/DL (ref 0.1–1.2)
BUN SERPL-MCNC: 15 MG/DL (ref 8–23)
BUN/CREAT SERPL: 12.9 (ref 7–25)
CALCIUM SERPL-MCNC: 9.2 MG/DL (ref 8.6–10.5)
CHLORIDE SERPL-SCNC: 105 MMOL/L (ref 98–107)
CHOLEST SERPL-MCNC: 140 MG/DL (ref 0–200)
CO2 SERPL-SCNC: 26.3 MMOL/L (ref 22–29)
CREAT SERPL-MCNC: 1.16 MG/DL (ref 0.76–1.27)
EOSINOPHIL # BLD AUTO: 0.13 10*3/MM3 (ref 0–0.7)
EOSINOPHIL NFR BLD AUTO: 1.9 % (ref 0.3–6.2)
ERYTHROCYTE [DISTWIDTH] IN BLOOD BY AUTOMATED COUNT: 14.5 % (ref 11.5–14.5)
GFR SERPLBLD CREATININE-BSD FMLA CKD-EPI: 62 ML/MIN/1.73
GFR SERPLBLD CREATININE-BSD FMLA CKD-EPI: 75 ML/MIN/1.73
GLOBULIN SER CALC-MCNC: 2.7 GM/DL
GLUCOSE SERPL-MCNC: 132 MG/DL (ref 65–99)
HBA1C MFR BLD: 5.1 % (ref 4.8–5.6)
HCT VFR BLD AUTO: 34.4 % (ref 40.4–52.2)
HDLC SERPL-MCNC: 35 MG/DL (ref 40–60)
HGB BLD-MCNC: 10.6 G/DL (ref 13.7–17.6)
IMM GRANULOCYTES # BLD: 0.02 10*3/MM3 (ref 0–0.03)
IMM GRANULOCYTES NFR BLD: 0.3 % (ref 0–0.5)
IRON SATN MFR SERPL: 9 % (ref 20–50)
IRON SERPL-MCNC: 44 MCG/DL (ref 59–158)
LDLC SERPL CALC-MCNC: 68 MG/DL (ref 0–100)
LYMPHOCYTES # BLD AUTO: 1.5 10*3/MM3 (ref 0.9–4.8)
LYMPHOCYTES NFR BLD AUTO: 22.1 % (ref 19.6–45.3)
MCH RBC QN AUTO: 28.3 PG (ref 27–32.7)
MCHC RBC AUTO-ENTMCNC: 30.8 G/DL (ref 32.6–36.4)
MCV RBC AUTO: 92 FL (ref 79.8–96.2)
MONOCYTES # BLD AUTO: 0.45 10*3/MM3 (ref 0.2–1.2)
MONOCYTES NFR BLD AUTO: 6.6 % (ref 5–12)
NEUTROPHILS # BLD AUTO: 4.65 10*3/MM3 (ref 1.9–8.1)
NEUTROPHILS NFR BLD AUTO: 68.5 % (ref 42.7–76)
PLATELET # BLD AUTO: 208 10*3/MM3 (ref 140–500)
POTASSIUM SERPL-SCNC: 4.5 MMOL/L (ref 3.5–5.2)
PROT SERPL-MCNC: 7 G/DL (ref 6–8.5)
RBC # BLD AUTO: 3.74 10*6/MM3 (ref 4.6–6)
SODIUM SERPL-SCNC: 145 MMOL/L (ref 136–145)
TIBC SERPL-MCNC: 513 MCG/DL
TRIGL SERPL-MCNC: 184 MG/DL (ref 0–150)
UIBC SERPL-MCNC: 469 MCG/DL
VLDLC SERPL CALC-MCNC: 36.8 MG/DL (ref 5–40)
WBC # BLD AUTO: 6.79 10*3/MM3 (ref 4.5–10.7)

## 2018-03-15 PROCEDURE — 99214 OFFICE O/P EST MOD 30 MIN: CPT | Performed by: FAMILY MEDICINE

## 2018-03-15 PROCEDURE — 69210 REMOVE IMPACTED EAR WAX UNI: CPT | Performed by: FAMILY MEDICINE

## 2018-03-15 NOTE — PROGRESS NOTES
Henri Santana is a 73 y.o. male.     Chief Complaint   Patient presents with   • Ear Fullness     Patient needs to get his right ear checked. He has some redness on left ear needs to have it looked at.    • Labs Only     Patient needs to get his potissume and iron levels checked.        HPI     Patient presents the office today to follow-up on history of hypertension, hyperlipidemia, iron deficiency anemia, and elevated glucose levels.  He has not had blood work in quite some time.  He is currently taking and tolerating Eliquis, iron supplementation, furosemide, metoprolol, and potassium.  No adverse side effects from the medications.    Patient states that he has had some decreased hearing in bilateral ears.  He does have a history of cerumen impaction.    The following portions of the patient's history were reviewed and updated as appropriate: allergies, current medications, past family history, past medical history, past social history, past surgical history and problem list.    Review of Systems   HENT: Positive for ear discharge.    All other systems reviewed and are negative.      Objective  Vitals:    03/15/18 1126   BP: 130/68   Pulse: 58   Resp: 16   Temp: 98.3 °F (36.8 °C)   SpO2: 100%       Physical Exam   Constitutional: He is oriented to person, place, and time. He appears well-developed and well-nourished. No distress.   HENT:   Head: Normocephalic and atraumatic.   Right Ear: External ear normal. A foreign body (cerumen impaction) is present.   Left Ear: External ear normal. A foreign body ( cerumen impaction) is present.   Nose: Nose normal.   Mouth/Throat: Oropharynx is clear and moist.   Eyes: Conjunctivae and EOM are normal. Pupils are equal, round, and reactive to light. Right eye exhibits no discharge. Left eye exhibits no discharge. No scleral icterus.   Cardiovascular: Normal rate, regular rhythm and normal heart sounds.    Pulmonary/Chest: Effort normal and breath sounds normal. No  respiratory distress. He has no wheezes. He has no rales.   Abdominal: Soft. Bowel sounds are normal. He exhibits no distension. There is no tenderness.   Neurological: He is alert and oriented to person, place, and time.   Skin: Skin is warm and dry. He is not diaphoretic.   Nursing note and vitals reviewed.        Current Outpatient Prescriptions:   •  albuterol (PROVENTIL HFA;VENTOLIN HFA) 108 (90 BASE) MCG/ACT inhaler, Inhale 2 puffs Every 4 (Four) Hours As Needed for wheezing., Disp: 1 inhaler, Rfl: 0  •  apixaban (ELIQUIS) 5 MG tablet tablet, Take 1 tablet by mouth Every 12 (Twelve) Hours., Disp: 60 tablet, Rfl: 0  •  carbamide peroxide (DEBROX) 6.5 % otic solution, Administer 5 drops to the right ear 2 (Two) Times a Day., Disp: 22 mL, Rfl: 0  •  ferrous sulfate 325 (65 FE) MG tablet, TAKE 1 TABLET DAILY WITH BREAKFAST, Disp: 30 tablet, Rfl: 3  •  furosemide (LASIX) 40 MG tablet, TAKE 1 TABLET DAILY, Disp: 90 tablet, Rfl: 2  •  metoprolol succinate XL (TOPROL-XL) 50 MG 24 hr tablet, Take 3 tablets by mouth Daily., Disp: 270 tablet, Rfl: 3  •  omeprazole (priLOSEC) 40 MG capsule, Take 1 capsule by mouth Daily., Disp: 90 capsule, Rfl: 3  •  potassium gluconate (EQL POTASSIUM GLUCONATE) 595 MG tablet tablet, Take 595 mg by mouth daily., Disp: , Rfl:   •  simethicone (MYLICON) 80 MG chewable tablet, Chew 80 mg every 6 (six) hours as needed for flatulence., Disp: , Rfl:     Ear Cerumen Removal Instrumentation  Date/Time: 3/15/2018 12:10 PM  Performed by: STEVE MAYORGA  Authorized by: STEVE MAYORGA   Consent: Verbal consent obtained.  Risks and benefits: risks, benefits and alternatives were discussed  Consent given by: patient  Patient identity confirmed: verbally with patient    Anesthesia:  Local Anesthetic: none  Location details: right ear and left ear  Patient tolerance: Patient tolerated the procedure well with no immediate complications  Procedure type: (Both curette and irrigation  used)      Sedation:  Patient sedated: no          Lab Results (most recent)     None              Henri was seen today for ear fullness and labs only.    Diagnoses and all orders for this visit:    Benign essential HTN  -     CBC Auto Differential  -     Comprehensive Metabolic Panel    Familial hypercholesterolemia  -     Lipid Panel    Iron deficiency anemia, unspecified iron deficiency anemia type  -     CBC Auto Differential  -     Iron and TIBC    Blood glucose elevated  -     Comprehensive Metabolic Panel  -     Hemoglobin A1c    Bilateral hearing loss due to cerumen impaction  -     Ear Cerumen Removal      Please see procedure note for cerumen impaction removal.  Blood work as above to evaluate current status of his chronic medical problems.  We'll adjust treatment plan based on those results.    Return in about 6 months (around 9/15/2018) for Recheck.      Loki Diane MD

## 2018-04-20 ENCOUNTER — TELEPHONE (OUTPATIENT)
Dept: FAMILY MEDICINE CLINIC | Facility: CLINIC | Age: 73
End: 2018-04-20

## 2018-04-20 DIAGNOSIS — D50.9 IRON DEFICIENCY ANEMIA, UNSPECIFIED IRON DEFICIENCY ANEMIA TYPE: Primary | ICD-10-CM

## 2018-04-21 LAB
BASOPHILS # BLD AUTO: 0.03 10*3/MM3 (ref 0–0.2)
BASOPHILS NFR BLD AUTO: 0.5 % (ref 0–1.5)
EOSINOPHIL # BLD AUTO: 0.1 10*3/MM3 (ref 0–0.7)
EOSINOPHIL NFR BLD AUTO: 1.5 % (ref 0.3–6.2)
ERYTHROCYTE [DISTWIDTH] IN BLOOD BY AUTOMATED COUNT: 15 % (ref 11.5–14.5)
HCT VFR BLD AUTO: 34.2 % (ref 40.4–52.2)
HGB BLD-MCNC: 10.6 G/DL (ref 13.7–17.6)
IMM GRANULOCYTES # BLD: 0 10*3/MM3 (ref 0–0.03)
IMM GRANULOCYTES NFR BLD: 0 % (ref 0–0.5)
LYMPHOCYTES # BLD AUTO: 1.2 10*3/MM3 (ref 0.9–4.8)
LYMPHOCYTES NFR BLD AUTO: 18.2 % (ref 19.6–45.3)
MCH RBC QN AUTO: 28.7 PG (ref 27–32.7)
MCHC RBC AUTO-ENTMCNC: 31 G/DL (ref 32.6–36.4)
MCV RBC AUTO: 92.7 FL (ref 79.8–96.2)
MONOCYTES # BLD AUTO: 0.42 10*3/MM3 (ref 0.2–1.2)
MONOCYTES NFR BLD AUTO: 6.4 % (ref 5–12)
NEUTROPHILS # BLD AUTO: 4.85 10*3/MM3 (ref 1.9–8.1)
NEUTROPHILS NFR BLD AUTO: 73.4 % (ref 42.7–76)
PLATELET # BLD AUTO: 205 10*3/MM3 (ref 140–500)
RBC # BLD AUTO: 3.69 10*6/MM3 (ref 4.6–6)
WBC # BLD AUTO: 6.6 10*3/MM3 (ref 4.5–10.7)

## 2018-05-07 RX ORDER — METOPROLOL SUCCINATE 50 MG/1
TABLET, EXTENDED RELEASE ORAL
Qty: 270 TABLET | Refills: 3 | Status: SHIPPED | OUTPATIENT
Start: 2018-05-07 | End: 2019-05-05 | Stop reason: SDUPTHER

## 2018-06-05 ENCOUNTER — TELEPHONE (OUTPATIENT)
Dept: CARDIOLOGY | Facility: CLINIC | Age: 73
End: 2018-06-05

## 2018-06-05 NOTE — TELEPHONE ENCOUNTER
Received a renewal request from [employer] on pts Eliquis    PA completed through cover my meds and sent to [facility]    Per Cover my meds:  Outcome   Approvedtoday   CaseId:[id number];Status:Approved;Review Type:Prior Auth;Coverage Start Date:05/06/2018;Coverage End Date:06/05/2019;

## 2018-06-18 RX ORDER — APIXABAN 5 MG/1
TABLET, FILM COATED ORAL
Qty: 180 TABLET | Refills: 3 | Status: SHIPPED | OUTPATIENT
Start: 2018-06-18 | End: 2019-06-05

## 2018-08-02 ENCOUNTER — OFFICE VISIT (OUTPATIENT)
Dept: CARDIOLOGY | Facility: CLINIC | Age: 73
End: 2018-08-02

## 2018-08-02 VITALS
WEIGHT: 241 LBS | SYSTOLIC BLOOD PRESSURE: 136 MMHG | HEIGHT: 70 IN | DIASTOLIC BLOOD PRESSURE: 74 MMHG | BODY MASS INDEX: 34.5 KG/M2 | RESPIRATION RATE: 16 BRPM | HEART RATE: 57 BPM

## 2018-08-02 DIAGNOSIS — E78.01 FAMILIAL HYPERCHOLESTEROLEMIA: ICD-10-CM

## 2018-08-02 DIAGNOSIS — I48.3 TYPICAL ATRIAL FLUTTER (HCC): ICD-10-CM

## 2018-08-02 DIAGNOSIS — I10 BENIGN ESSENTIAL HTN: ICD-10-CM

## 2018-08-02 DIAGNOSIS — I48.0 PAROXYSMAL ATRIAL FIBRILLATION (HCC): Primary | ICD-10-CM

## 2018-08-02 DIAGNOSIS — IMO0001 CLASS 2 OBESITY DUE TO EXCESS CALORIES WITH SERIOUS COMORBIDITY IN ADULT, UNSPECIFIED BMI: ICD-10-CM

## 2018-08-02 DIAGNOSIS — G47.33 OBSTRUCTIVE SLEEP APNEA: ICD-10-CM

## 2018-08-02 PROCEDURE — 99214 OFFICE O/P EST MOD 30 MIN: CPT | Performed by: INTERNAL MEDICINE

## 2018-08-02 PROCEDURE — 93000 ELECTROCARDIOGRAM COMPLETE: CPT | Performed by: INTERNAL MEDICINE

## 2018-08-02 NOTE — PROGRESS NOTES
PATIENTINFORMATION    Date of Office Visit: 2018  Encounter Provider: Whit Casiano MD  Place of Service: [facility] CARDIOLOGY  Patient Name: Henri Santana  : 1945    Subjective:     Encounter Date:2018      Patient ID: Henri Santana is a 73 y.o. male.      History of Present Illness    I first saw him in 2014. He presented with complaints of very typical chest pain. I set the patient up for a heart catheterization and his blood work came back showing he was significantly anemic. I admitted him to the hospital and he was found to have iron deficient anemia. GI was consulted. A colonoscopy showed a mass in his colon. The patient reports that the pathology came back as being precancerous, but it was oozing and likely the source of his bleeding. He did have an echocardiogram on 2014 which showed mild to moderate left ventricular hypertrophy with a hyperdynamic left ventricle without gradient. There was stage 1 diastolic dysfunction and no significant valvular heart disease.       After his blood transfusions, he had no further symptoms of chest pain. Prior to his surgery to remove the colon mass, he had a nuclear stress test  which did not show any evidence of ischemia. He was hospitalized to have a partial colectomy at the end of 2014. While in the hospital he had paroxysmal atrial fibrillation with a rapid ventricular response. He was initially treated with some amiodarone but discharged on only a beta blocker. He wore a 30-day event monitor after discharge from the hospital. This revealed one episode of atrial fibrillation with a rapid ventricular rate. I increased his Metoprolol at that time.      He was doing well until 2016. He was in [place]. He woke up around midnight with significant chest pain. He went to the hospital and was found to be in atrial fibrillation with rapid ventricular response. He reports they tried him on medication over  the weekend but when he did not convert, they did a cardioversion. He was started on propafenone and Eliquis and discharged. For some reason, he was taken off his metoprolol. He then became tachycardic and went back to see the doctor in [place] and they put him back on his metoprolol. He had an echo in 3/16 in [place]. It showed EF 55% to 60%, unable to accurately assess diastolic LV function, mildly dilated left atrium, mild tricuspid regurgitation, aortic valve leaflets mildly sclerotic, small pericardial effusion suspected and severe concentric left ventricular hypertrophy.       On April 11, 2016, he presented to the chest pain unit and was in atrial flutter with a rapid ventricular response. He was volume overloaded at that time and anemic. He was admitted to the hospital. His propafenone was discontinued. He spontaneously converted to sinus rhythm. Dr. Kunz saw him and recommended an atrial flutter ablation. Because of anemia, his Eliquis was discontinued. Dr. Kunz felt we could still be the flutter ablation as long as he was in sinus rhythm. However, when he saw Dr. Kunz on April 19 he was in atrial fibrillation. He underwent a colonoscopy and EGD while he was hospitalized. They saw a polyp which was removed. They saw some inflammation in the stomach.       I saw him back in May 2016 and at that time we resumed Eliquis. I repeated blood work and his hemoglobin was stable.      In August 2016, I set him up for an atrial flutter ablation. However, he was sick when he was supposed to have it done and canceled the procedure. Now he is decided that he would like to wait and postpone having a performed. He has been diagnosed with sleep apnea.  Initially, he was compliant with CPAP.  However, over the winter he got a series of upper respiratory tract infections which he linked to his CPAP device and so he returned it.    In 2017 he was complaining of increased shortness of breath.  I performed an  echocardiogram on him which revealed normal LV systolic function, an ejection fraction of 59%, grade I diastolic dysfunction and no significant valvular heart disease.  He had a stress test which revealed normal LV systolic function, and no perfusion abnormalities.  He wore a Holter monitor for 24-hour and this was a normal study.         He tells me that his hemoglobin has again dropped a little bit.  He has not scheduled a followup with GI yet.  He did have dark stools for a while but says they no longer are.  He is on iron.  From a cardiac standpoint, he seems to be doing well.  He denies any palpitations, edema or chest pain.  He has some shortness of breath on exertion which he attributes to his weight.  He is feeling a little bit more fatigued which he attributes to his anemia.      Review of Systems   Constitution: Positive for malaise/fatigue. Negative for fever, weight gain and weight loss.   HENT: Negative for ear pain, hearing loss, nosebleeds and sore throat.    Eyes: Negative for double vision, pain, vision loss in left eye and vision loss in right eye.   Cardiovascular:        See history of present illness.   Respiratory: Positive for cough and shortness of breath. Negative for sleep disturbances due to breathing, snoring and wheezing.    Endocrine: Negative for cold intolerance, heat intolerance and polyuria.   Skin: Negative for itching, poor wound healing and rash.   Musculoskeletal: Positive for joint pain. Negative for joint swelling and myalgias.   Gastrointestinal: Negative for abdominal pain, diarrhea, hematochezia, nausea and vomiting.   Genitourinary: Negative for hematuria and hesitancy.   Neurological: Positive for paresthesias. Negative for numbness and seizures.   Psychiatric/Behavioral: Negative for depression. The patient is not nervous/anxious.            ECG 12 Lead  Date/Time: 8/2/2018 10:44 AM  Performed by: MIRNA DAVIDSON  Authorized by: MIRNA DAVIDSON   Comparison: compared  "with previous ECG from 5/11/2017  Comparison to previous ECG: No longer with PVCs  Rhythm: sinus rhythm  BPM: 57  Conduction: conduction normal  ST Segments: ST segments normal  T Waves: T waves normal  Clinical impression: normal ECG               Objective:     /74 (BP Location: Left arm, Patient Position: Sitting, Cuff Size: Adult)   Pulse 57   Resp 16   Ht 177.8 cm (70\")   Wt 109 kg (241 lb)   BMI 34.58 kg/m²  Body mass index is 34.58 kg/m².     Physical Exam   Constitutional: He appears well-developed.   HENT:   Head: Normocephalic and atraumatic.   Eyes: Pupils are equal, round, and reactive to light. Conjunctivae and lids are normal. Lids are everted and swept, no foreign bodies found.   Neck: Normal range of motion. No JVD present. Carotid bruit is not present. No tracheal deviation present. No thyroid mass present.   Cardiovascular: Normal rate, regular rhythm and normal heart sounds.    Pulses:       Dorsalis pedis pulses are 2+ on the right side, and 2+ on the left side.   Pulmonary/Chest: Effort normal and breath sounds normal.   Abdominal: Normal appearance and bowel sounds are normal.   Musculoskeletal: Normal range of motion.   Neurological: He is alert. He has normal strength.   Skin: Skin is warm, dry and intact.   Psychiatric: He has a normal mood and affect. His behavior is normal.   Vitals reviewed.            Assessment/Plan:       1. Atrial Fibrillation and Atrial Flutter  Assessment  • The patient has paroxysmal atrial fibrillation and persistent atrial flutter  • This is non-valvular in etiology  • The patient's CHADS2-VASc score is 2  • A [id number] score of 2 or more is considered a high risk for a thromboembolic event    Plan  • Continue in atrial fibrillation with rate control  • Continue apixaban for antithrombotic therapy, bleeding issues discussed  • Continue beta blocker for rate control  2. Diastolic heart failure secondary to arrhythmia.    3. Anemia. I encouraged him " to see his GI doctor.  4. Elevated liver enzymes. He follows this with Dr. Cheema.  5. Hyperlipidemia. He really needs diet and weight loss.    6. Obesity.   7. Obstructive sleep apnea.  He feels he cannot tolerate CPAP because it causes upper respiratory tract infections.    I will see him back in a year     Orders Placed This Encounter   Procedures   • ECG 12 Lead     This order was created via procedure documentation        Discharge Medications          Accurate as of 8/2/18 12:10 PM. If you have any questions, ask your nurse or doctor.               Continue These Medications      Instructions Start Date   albuterol 108 (90 Base) MCG/ACT inhaler  Commonly known as:  PROVENTIL HFA;VENTOLIN HFA   2 puffs, Inhalation, Every 4 Hours PRN      carbamide peroxide 6.5 % otic solution  Commonly known as:  DEBROX   5 drops, Right Ear, 2 Times Daily      ELIQUIS 5 MG tablet tablet  Generic drug:  apixaban   TAKE 1 TABLET EVERY 12 HOURS      EQL POTASSIUM GLUCONATE 595 (99 K) MG tablet tablet  Generic drug:  potassium gluconate   595 mg, Oral, Daily      ferrous sulfate 325 (65 FE) MG tablet   TAKE 1 TABLET DAILY WITH BREAKFAST      furosemide 40 MG tablet  Commonly known as:  LASIX   TAKE 1 TABLET DAILY      metoprolol succinate XL 50 MG 24 hr tablet  Commonly known as:  TOPROL-XL   TAKE 3 TABLETS DAILY      omeprazole 40 MG capsule  Commonly known as:  priLOSEC   40 mg, Oral, Daily      simethicone 80 MG chewable tablet  Commonly known as:  MYLICON   80 mg, Oral, Every 6 Hours PRN                    Whit Casiano MD  08/02/18  12:10 PM

## 2018-10-02 RX ORDER — FUROSEMIDE 40 MG/1
TABLET ORAL
Qty: 90 TABLET | Refills: 2 | Status: SHIPPED | OUTPATIENT
Start: 2018-10-02 | End: 2019-06-05 | Stop reason: ALTCHOICE

## 2018-11-05 ENCOUNTER — OFFICE VISIT (OUTPATIENT)
Dept: FAMILY MEDICINE CLINIC | Facility: CLINIC | Age: 73
End: 2018-11-05

## 2018-11-05 VITALS
OXYGEN SATURATION: 98 % | BODY MASS INDEX: 34.36 KG/M2 | TEMPERATURE: 98.1 F | DIASTOLIC BLOOD PRESSURE: 60 MMHG | HEART RATE: 71 BPM | WEIGHT: 240 LBS | SYSTOLIC BLOOD PRESSURE: 134 MMHG | HEIGHT: 70 IN

## 2018-11-05 DIAGNOSIS — Z86.2 HISTORY OF ANEMIA: Primary | ICD-10-CM

## 2018-11-05 DIAGNOSIS — J06.9 ACUTE URI: ICD-10-CM

## 2018-11-05 PROCEDURE — 99213 OFFICE O/P EST LOW 20 MIN: CPT | Performed by: NURSE PRACTITIONER

## 2018-11-05 RX ORDER — AMOXICILLIN 875 MG/1
875 TABLET, COATED ORAL 2 TIMES DAILY
Qty: 20 TABLET | Refills: 0 | Status: SHIPPED | OUTPATIENT
Start: 2018-11-05 | End: 2019-05-17

## 2018-11-05 NOTE — PROGRESS NOTES
"Subjective   Henri Santana is a 73 y.o. male.     History of Present Illness   Upper Respiratory Infection: Patient complains of symptoms of a URI. Symptoms include congestion, coryza, sore throat and swollen glands. Onset of symptoms was 4 days ago, gradually worsening since that time. He also c/o congestion for the past 3 days .  He is drinking plenty of fluids. Evaluation to date: none. Treatment to date: antihistamines.    Would like his iron levels checked bc has been low in the past and he is feeling run down.    The following portions of the patient's history were reviewed and updated as appropriate: allergies, current medications, past social history and problem list.    Review of Systems   Respiratory: Positive for cough.    All other systems reviewed and are negative.      Objective   /60 (BP Location: Left arm, Patient Position: Sitting)   Pulse 71   Temp 98.1 °F (36.7 °C)   Ht 177.8 cm (70\")   Wt 109 kg (240 lb)   SpO2 98%   BMI 34.44 kg/m²   Physical Exam   Constitutional: He is oriented to person, place, and time. Vital signs are normal. He appears well-developed and well-nourished. No distress.   HENT:   Head: Normocephalic.   Cardiovascular: Normal rate, regular rhythm and normal heart sounds.    Pulmonary/Chest: Effort normal and breath sounds normal.   Neurological: He is alert and oriented to person, place, and time. Gait normal.   Psychiatric: He has a normal mood and affect. His behavior is normal. Judgment and thought content normal.   Vitals reviewed.      Assessment/Plan      Diagnosis Plan   1. History of anemia  CBC & Differential    Iron Profile   2. Acute URI       Follow up after labs   rto alivia Pendleton, SWEETIE  11/5/2018    "

## 2018-11-06 LAB
BASOPHILS # BLD AUTO: 0.05 10*3/MM3 (ref 0–0.2)
BASOPHILS NFR BLD AUTO: 0.7 % (ref 0–1.5)
EOSINOPHIL # BLD AUTO: 0.08 10*3/MM3 (ref 0–0.7)
EOSINOPHIL NFR BLD AUTO: 1 % (ref 0.3–6.2)
ERYTHROCYTE [DISTWIDTH] IN BLOOD BY AUTOMATED COUNT: 14.3 % (ref 11.5–14.5)
FERRITIN SERPL-MCNC: 22.93 NG/ML (ref 30–400)
HCT VFR BLD AUTO: 34.7 % (ref 40.4–52.2)
HGB BLD-MCNC: 10.6 G/DL (ref 13.7–17.6)
IMM GRANULOCYTES # BLD: 0 10*3/MM3 (ref 0–0.03)
IMM GRANULOCYTES NFR BLD: 0 % (ref 0–0.5)
IRON SATN MFR SERPL: 31 % (ref 20–50)
IRON SERPL-MCNC: 157 MCG/DL (ref 59–158)
LYMPHOCYTES # BLD AUTO: 1.49 10*3/MM3 (ref 0.9–4.8)
LYMPHOCYTES NFR BLD AUTO: 19.5 % (ref 19.6–45.3)
MCH RBC QN AUTO: 28 PG (ref 27–32.7)
MCHC RBC AUTO-ENTMCNC: 30.5 G/DL (ref 32.6–36.4)
MCV RBC AUTO: 91.8 FL (ref 79.8–96.2)
MONOCYTES # BLD AUTO: 0.4 10*3/MM3 (ref 0.2–1.2)
MONOCYTES NFR BLD AUTO: 5.2 % (ref 5–12)
NEUTROPHILS # BLD AUTO: 5.62 10*3/MM3 (ref 1.9–8.1)
NEUTROPHILS NFR BLD AUTO: 73.6 % (ref 42.7–76)
PLATELET # BLD AUTO: 240 10*3/MM3 (ref 140–500)
RBC # BLD AUTO: 3.78 10*6/MM3 (ref 4.6–6)
TIBC SERPL-MCNC: 511 MCG/DL
UIBC SERPL-MCNC: 354 MCG/DL
WBC # BLD AUTO: 7.64 10*3/MM3 (ref 4.5–10.7)

## 2018-12-17 RX ORDER — OMEPRAZOLE 40 MG/1
CAPSULE, DELAYED RELEASE ORAL
Qty: 90 CAPSULE | Refills: 3 | Status: SHIPPED | OUTPATIENT
Start: 2018-12-17 | End: 2019-06-05

## 2019-05-06 RX ORDER — METOPROLOL SUCCINATE 50 MG/1
TABLET, EXTENDED RELEASE ORAL
Qty: 270 TABLET | Refills: 3 | Status: SHIPPED | OUTPATIENT
Start: 2019-05-06 | End: 2019-06-05

## 2019-05-17 ENCOUNTER — OFFICE VISIT (OUTPATIENT)
Dept: FAMILY MEDICINE CLINIC | Facility: CLINIC | Age: 74
End: 2019-05-17

## 2019-05-17 VITALS
WEIGHT: 244 LBS | TEMPERATURE: 98.3 F | DIASTOLIC BLOOD PRESSURE: 66 MMHG | HEART RATE: 62 BPM | OXYGEN SATURATION: 99 % | RESPIRATION RATE: 16 BRPM | SYSTOLIC BLOOD PRESSURE: 122 MMHG | HEIGHT: 70 IN | BODY MASS INDEX: 34.93 KG/M2

## 2019-05-17 DIAGNOSIS — R73.9 BLOOD GLUCOSE ELEVATED: ICD-10-CM

## 2019-05-17 DIAGNOSIS — Z00.00 INITIAL MEDICARE ANNUAL WELLNESS VISIT: Primary | ICD-10-CM

## 2019-05-17 DIAGNOSIS — E78.01 FAMILIAL HYPERCHOLESTEROLEMIA: ICD-10-CM

## 2019-05-17 DIAGNOSIS — J20.9 BRONCHITIS WITH BRONCHOSPASM: ICD-10-CM

## 2019-05-17 DIAGNOSIS — C61 CA OF PROSTATE (HCC): ICD-10-CM

## 2019-05-17 DIAGNOSIS — R13.10 DYSPHAGIA, UNSPECIFIED TYPE: ICD-10-CM

## 2019-05-17 PROCEDURE — 99214 OFFICE O/P EST MOD 30 MIN: CPT | Performed by: FAMILY MEDICINE

## 2019-05-17 PROCEDURE — G0438 PPPS, INITIAL VISIT: HCPCS | Performed by: FAMILY MEDICINE

## 2019-05-17 NOTE — PROGRESS NOTES
QUICK REFERENCE INFORMATION:  The ABCs of the Annual Wellness Visit    Subsequent Medicare Wellness Visit     HEALTH RISK ASSESSMENT    : 1945    Recent Hospitalizations:  No hospitalization(s) within the last year..  ccc      Current Medical Providers:  Patient Care Team:  Loki Diane MD as PCP - General (Family Medicine)  Whit Casiano MD as Consulting Physician (Cardiology)  Hoa Alan MD as Consulting Physician (Gastroenterology)        Smoking Status:  Social History     Tobacco Use   Smoking Status Former Smoker   • Packs/day: 1.00   • Years: 30.00   • Pack years: 30.00   • Types: Cigarettes   • Last attempt to quit:    • Years since quittin.4   Smokeless Tobacco Former User       Alcohol Consumption:  Social History     Substance and Sexual Activity   Alcohol Use Yes    Comment: social       Depression Screen:   PHQ-2/PHQ-9 Depression Screening 2019   Little interest or pleasure in doing things 0   Feeling down, depressed, or hopeless 0   Trouble falling or staying asleep, or sleeping too much 0   Feeling tired or having little energy 2   Poor appetite or overeating 1   Feeling bad about yourself - or that you are a failure or have let yourself or your family down 0   Trouble concentrating on things, such as reading the newspaper or watching television 0   Moving or speaking so slowly that other people could have noticed. Or the opposite - being so fidgety or restless that you have been moving around a lot more than usual 0   Thoughts that you would be better off dead, or of hurting yourself in some way 0   Total Score 3   If you checked off any problems, how difficult have these problems made it for you to do your work, take care of things at home, or get along with other people? Not difficult at all       Health Habits and Functional and Cognitive Screening:  Functional & Cognitive Status 2019   Do you have difficulty preparing food and eating? No   Do you have  difficulty bathing yourself, getting dressed or grooming yourself? No   Do you have difficulty using the toilet? No   Do you have difficulty moving around from place to place? No   Do you have trouble with steps or getting out of a bed or a chair? No   In the past year have you fallen or experienced a near fall? No   Current Diet Unhealthy Diet   Dental Exam Not up to date   Eye Exam Not up to date   Exercise (times per week) 0 times per week   Do you need help using the phone?  No   Are you deaf or do you have serious difficulty hearing?  No   Do you need help with transportation? No   Do you need help shopping? No   Do you need help preparing meals?  No   Do you need help with housework?  No   Do you need help with laundry? No   Do you need help taking your medications? No   Do you need help managing money? No   Do you ever drive or ride in a car without wearing a seat belt? No           Does the patient have evidence of cognitive impairment? No    Asiprin use counseling: Does not need ASA (and currently is not on it)      Recent Lab Results:    Lab Results   Component Value Date     (H) 03/15/2018     Lab Results   Component Value Date    HGBA1C 5.10 03/15/2018     Lab Results   Component Value Date    CHOL 140 05/12/2017    TRIG 184 (H) 03/15/2018    HDL 35 (L) 03/15/2018    VLDL 36.8 03/15/2018    LDLHDL 2.05 05/12/2017           Age-appropriate Screening Schedule:  Refer to the list below for future screening recommendations based on patient's age, sex and/or medical conditions. Orders for these recommended tests are listed in the plan section. The patient has been provided with a written plan.    Health Maintenance   Topic Date Due   • TDAP/TD VACCINES (1 - Tdap) 01/02/1994   • ZOSTER VACCINE (1 of 2) 02/26/1997   • PNEUMOCOCCAL VACCINES (65+ LOW/MEDIUM RISK) (1 of 2 - PCV13) 02/26/2012   • LIPID PANEL  03/15/2019   • INFLUENZA VACCINE  08/01/2019   • COLONOSCOPY  04/14/2026        Subjective   History  of Present Illness    Henri Santana is a 74 y.o. male who presents for an Annual Wellness Visit.    Patient is also here to discuss some issues that are new to me.  Patient states that he is been having issues with a cough.  Happens after he eats or drinks.  Somewhat having difficulty swallowing foods.  Patient also with history of prostate cancer and needs to have blood work to evaluate.  History of elevated glucose levels as well as hyperlipidemia.    The following portions of the patient's history were reviewed and updated as appropriate: allergies, current medications, past family history, past medical history, past social history, past surgical history and problem list.    Outpatient Medications Prior to Visit   Medication Sig Dispense Refill   • carbamide peroxide (DEBROX) 6.5 % otic solution Administer 5 drops to the right ear 2 (Two) Times a Day. 22 mL 0   • ELIQUIS 5 MG tablet tablet TAKE 1 TABLET EVERY 12 HOURS 180 tablet 3   • ferrous sulfate 325 (65 FE) MG tablet TAKE 1 TABLET DAILY WITH BREAKFAST 30 tablet 3   • furosemide (LASIX) 40 MG tablet TAKE 1 TABLET DAILY 90 tablet 2   • metoprolol succinate XL (TOPROL-XL) 50 MG 24 hr tablet TAKE 3 TABLETS DAILY 270 tablet 3   • omeprazole (priLOSEC) 40 MG capsule TAKE 1 CAPSULE DAILY 90 capsule 3   • potassium gluconate (EQL POTASSIUM GLUCONATE) 595 MG tablet tablet Take 595 mg by mouth daily.     • simethicone (MYLICON) 80 MG chewable tablet Chew 80 mg every 6 (six) hours as needed for flatulence.     • albuterol (PROVENTIL HFA;VENTOLIN HFA) 108 (90 BASE) MCG/ACT inhaler Inhale 2 puffs Every 4 (Four) Hours As Needed for wheezing. 1 inhaler 0   • amoxicillin (AMOXIL) 875 MG tablet Take 1 tablet by mouth 2 (Two) Times a Day. 20 tablet 0     No facility-administered medications prior to visit.        Patient Active Problem List   Diagnosis   • Benign essential HTN   • Acid reflux   • Blood glucose elevated   • HLD (hyperlipidemia)   • CA of prostate (CMS/HCC)   •  "Class 2 obesity due to excess calories with serious comorbidity in adult   • Atrial flutter (CMS/HCC)   • Atrial fibrillation (CMS/HCC)   • Hypersomnolence   • Obstructive sleep apnea   • Anemia   • History of anemia   • Acute URI       Advance Care Planning:  Patient does not have an advance directive - information provided to the patient today    Identification of Risk Factors:  Risk factors include: weight , unhealthy diet and cardiovascular risk.    Review of Systems   Respiratory: Positive for cough.    Musculoskeletal: Positive for joint swelling.   All other systems reviewed and are negative.      Compared to one year ago, the patient feels his physical health is worse.  Compared to one year ago, the patient feels his mental health is the same.    Objective     Physical Exam   Constitutional: He is oriented to person, place, and time. He appears well-developed and well-nourished. No distress.   HENT:   Head: Normocephalic and atraumatic.   Right Ear: External ear normal.   Left Ear: External ear normal.   Nose: Nose normal.   Mouth/Throat: Oropharynx is clear and moist.   Eyes: Conjunctivae and EOM are normal. Pupils are equal, round, and reactive to light. Right eye exhibits no discharge. Left eye exhibits no discharge. No scleral icterus.   Cardiovascular: Normal rate, regular rhythm and normal heart sounds.   Pulmonary/Chest: Effort normal and breath sounds normal. No respiratory distress. He has no wheezes. He has no rales.   Abdominal: Soft. Bowel sounds are normal. He exhibits no distension. There is no tenderness.   Neurological: He is alert and oriented to person, place, and time.   Skin: Skin is warm and dry. He is not diaphoretic.   Nursing note and vitals reviewed.      Vitals:    05/17/19 0921   BP: 122/66   Pulse: 62   Resp: 16   Temp: 98.3 °F (36.8 °C)   TempSrc: Oral   SpO2: 99%   Weight: 111 kg (244 lb)   Height: 177.8 cm (70\")   PainSc: 0-No pain       Patient's Body mass index is 35.01 " kg/m². BMI is above normal parameters. Recommendations include: exercise counseling and nutrition counseling.      Assessment/Plan   Patient Self-Management and Personalized Health Advice  The patient has been provided with information about: diet, exercise and weight management and preventive services including:   · Advance directive, Exercise counseling provided, Nutrition counseling provided.    Visit Diagnoses:    ICD-10-CM ICD-9-CM   1. Initial Medicare annual wellness visit Z00.00 V70.0   2. Bronchitis with bronchospasm J20.9 490   3. Dysphagia, unspecified type R13.10 787.20   4. CA of prostate (CMS/[facility]) C61 185   5. Blood glucose elevated R73.9 790.29   6. Familial hypercholesterolemia E78.01 272.0       Orders Placed This Encounter   Procedures   • Comprehensive Metabolic Panel   • Hemoglobin A1c   • Lipid Panel   • PSA DIAGNOSTIC   • Ambulatory Referral to ENT (Otolaryngology)     Referral Priority:   Routine     Referral Type:   Consultation     Referral Reason:   Specialty Services Required     Referred to Provider:   Pascual Car MD     Requested Specialty:   Otolaryngology     Number of Visits Requested:   1       Outpatient Encounter Medications as of 5/17/2019   Medication Sig Dispense Refill   • carbamide peroxide (DEBROX) 6.5 % otic solution Administer 5 drops to the right ear 2 (Two) Times a Day. 22 mL 0   • ELIQUIS 5 MG tablet tablet TAKE 1 TABLET EVERY 12 HOURS 180 tablet 3   • ferrous sulfate 325 (65 FE) MG tablet TAKE 1 TABLET DAILY WITH BREAKFAST 30 tablet 3   • furosemide (LASIX) 40 MG tablet TAKE 1 TABLET DAILY 90 tablet 2   • metoprolol succinate XL (TOPROL-XL) 50 MG 24 hr tablet TAKE 3 TABLETS DAILY 270 tablet 3   • omeprazole (priLOSEC) 40 MG capsule TAKE 1 CAPSULE DAILY 90 capsule 3   • potassium gluconate (EQL POTASSIUM GLUCONATE) 595 MG tablet tablet Take 595 mg by mouth daily.     • simethicone (MYLICON) 80 MG chewable tablet Chew 80 mg every 6 (six) hours as needed for  flatulence.     • [DISCONTINUED] albuterol (PROVENTIL HFA;VENTOLIN HFA) 108 (90 BASE) MCG/ACT inhaler Inhale 2 puffs Every 4 (Four) Hours As Needed for wheezing. 1 inhaler 0   • albuterol (PROAIR RESPICLICK) 108 (90 Base) MCG/ACT inhaler Inhale 2 puffs Every 4 (Four) Hours As Needed for Wheezing or Shortness of Air. 1 inhaler 11   • [DISCONTINUED] amoxicillin (AMOXIL) 875 MG tablet Take 1 tablet by mouth 2 (Two) Times a Day. 20 tablet 0     No facility-administered encounter medications on file as of 5/17/2019.        Reviewed use of high risk medication in the elderly: yes  Reviewed for potential of harmful drug interactions in the elderly: yes      Will get labs as above to evaluate the status of his chronic medical problems as well as to search for underlying cause of his current issues.  Will refer to ENT.Some concern for possible dysphasia.    Follow Up:  Return for As needed.     An After Visit Summary and PPPS with all of these plans were given to the patient.

## 2019-05-17 NOTE — PATIENT INSTRUCTIONS
Medicare Wellness  Personal Prevention Plan of Service     Date of Office Visit:  2019  Encounter Provider:  Loki Diane MD  Place of Service:  [facility] FAMILY MEDICINE  Patient Name: Henri Santana  :  1945    As part of the Medicare Wellness portion of your visit today, we are providing you with this personalized preventive plan of services (PPPS). This plan is based upon recommendations of the [place] Preventive Services Task Force (USPSTF) and the Advisory Committee on Immunization Practices (ACIP).    This lists the preventive care services that should be considered, and provides dates of when you are due. Items listed as completed are up-to-date and do not require any further intervention.    Health Maintenance   Topic Date Due   • TDAP/TD VACCINES (1 - Tdap) 1994   • ZOSTER VACCINE (1 of 2) 1997   • PNEUMOCOCCAL VACCINES (65+ LOW/MEDIUM RISK) (1 of 2 - PCV13) 2012   • HEPATITIS C SCREENING  2017   • LIPID PANEL  03/15/2019   • INFLUENZA VACCINE  2019   • MEDICARE ANNUAL WELLNESS  2020   • COLONOSCOPY  2026   • AAA SCREEN (ONE-TIME)  Completed       Orders Placed This Encounter   Procedures   • Comprehensive Metabolic Panel   • Hemoglobin A1c   • Lipid Panel   • PSA DIAGNOSTIC   • Ambulatory Referral to ENT (Otolaryngology)     Referral Priority:   Routine     Referral Type:   Consultation     Referral Reason:   Specialty Services Required     Referred to Provider:   Pascual Car MD     Requested Specialty:   Otolaryngology     Number of Visits Requested:   1       Return for As needed.

## 2019-05-18 LAB
ALBUMIN SERPL-MCNC: 3.3 G/DL (ref 3.5–5.2)
ALBUMIN/GLOB SERPL: 1 G/DL
ALP SERPL-CCNC: 128 U/L (ref 39–117)
ALT SERPL-CCNC: 21 U/L (ref 1–41)
AST SERPL-CCNC: 39 U/L (ref 1–40)
BILIRUB SERPL-MCNC: 1 MG/DL (ref 0.2–1.2)
BUN SERPL-MCNC: 10 MG/DL (ref 8–23)
BUN/CREAT SERPL: 8.7 (ref 7–25)
CALCIUM SERPL-MCNC: 9.4 MG/DL (ref 8.6–10.5)
CHLORIDE SERPL-SCNC: 107 MMOL/L (ref 98–107)
CHOLEST SERPL-MCNC: 108 MG/DL (ref 0–200)
CO2 SERPL-SCNC: 26.2 MMOL/L (ref 22–29)
CREAT SERPL-MCNC: 1.15 MG/DL (ref 0.76–1.27)
GLOBULIN SER CALC-MCNC: 3.2 GM/DL
GLUCOSE SERPL-MCNC: 148 MG/DL (ref 65–99)
HBA1C MFR BLD: 5.4 % (ref 4.8–5.6)
HDLC SERPL-MCNC: 29 MG/DL (ref 40–60)
LDLC SERPL CALC-MCNC: 39 MG/DL (ref 0–100)
POTASSIUM SERPL-SCNC: 4.1 MMOL/L (ref 3.5–5.2)
PROT SERPL-MCNC: 6.5 G/DL (ref 6–8.5)
PSA SERPL-MCNC: 0.02 NG/ML (ref 0–4)
SODIUM SERPL-SCNC: 143 MMOL/L (ref 136–145)
TRIGL SERPL-MCNC: 199 MG/DL (ref 0–150)
VLDLC SERPL CALC-MCNC: 39.8 MG/DL

## 2019-06-05 ENCOUNTER — HOSPITAL ENCOUNTER (INPATIENT)
Facility: HOSPITAL | Age: 74
LOS: 2 days | Discharge: HOME | End: 2019-06-07
Attending: EMERGENCY MEDICINE | Admitting: HOSPITALIST

## 2019-06-05 ENCOUNTER — APPOINTMENT (OUTPATIENT)
Dept: GENERAL RADIOLOGY | Facility: HOSPITAL | Age: 74
End: 2019-06-05

## 2019-06-05 ENCOUNTER — TELEPHONE (OUTPATIENT)
Dept: CARDIOLOGY | Facility: CLINIC | Age: 74
End: 2019-06-05

## 2019-06-05 DIAGNOSIS — D50.9 IRON DEFICIENCY ANEMIA, UNSPECIFIED IRON DEFICIENCY ANEMIA TYPE: ICD-10-CM

## 2019-06-05 DIAGNOSIS — K92.2 UGI BLEED: Primary | ICD-10-CM

## 2019-06-05 DIAGNOSIS — K21.9 GASTROESOPHAGEAL REFLUX DISEASE, ESOPHAGITIS PRESENCE NOT SPECIFIED: ICD-10-CM

## 2019-06-05 DIAGNOSIS — R19.5 HEME POSITIVE STOOL: ICD-10-CM

## 2019-06-05 DIAGNOSIS — D64.9 ANEMIA, UNSPECIFIED TYPE: ICD-10-CM

## 2019-06-05 DIAGNOSIS — R07.9 CHEST PAIN, UNSPECIFIED TYPE: ICD-10-CM

## 2019-06-05 PROBLEM — R07.89 CHEST PAIN, ATYPICAL: Status: ACTIVE | Noted: 2019-06-05

## 2019-06-05 PROBLEM — I50.32 CHRONIC DIASTOLIC CHF (CONGESTIVE HEART FAILURE) (HCC): Chronic | Status: ACTIVE | Noted: 2019-06-05

## 2019-06-05 PROBLEM — Z79.01 CHRONIC ANTICOAGULATION: Chronic | Status: ACTIVE | Noted: 2019-06-05

## 2019-06-05 LAB
ABO GROUP BLD: NORMAL
ALBUMIN SERPL-MCNC: 3.3 G/DL (ref 3.5–5.2)
ALBUMIN/GLOB SERPL: 1 G/DL
ALP SERPL-CCNC: 116 U/L (ref 39–117)
ALT SERPL W P-5'-P-CCNC: 18 U/L (ref 1–41)
ANION GAP SERPL CALCULATED.3IONS-SCNC: 12.9 MMOL/L
AST SERPL-CCNC: 34 U/L (ref 1–40)
BASOPHILS # BLD AUTO: 0.06 10*3/MM3 (ref 0–0.2)
BASOPHILS NFR BLD AUTO: 0.9 % (ref 0–1.5)
BILIRUB SERPL-MCNC: 0.8 MG/DL (ref 0.2–1.2)
BLD GP AB SCN SERPL QL: NEGATIVE
BUN BLD-MCNC: 12 MG/DL (ref 8–23)
BUN/CREAT SERPL: 11.8 (ref 7–25)
CALCIUM SPEC-SCNC: 9 MG/DL (ref 8.6–10.5)
CHLORIDE SERPL-SCNC: 103 MMOL/L (ref 98–107)
CO2 SERPL-SCNC: 24.1 MMOL/L (ref 22–29)
CREAT BLD-MCNC: 1.02 MG/DL (ref 0.76–1.27)
DEPRECATED RDW RBC AUTO: 50.4 FL (ref 37–54)
EOSINOPHIL # BLD AUTO: 0.05 10*3/MM3 (ref 0–0.4)
EOSINOPHIL NFR BLD AUTO: 0.7 % (ref 0.3–6.2)
ERYTHROCYTE [DISTWIDTH] IN BLOOD BY AUTOMATED COUNT: 16.1 % (ref 12.3–15.4)
GFR SERPL CREATININE-BSD FRML MDRD: 72 ML/MIN/1.73
GLOBULIN UR ELPH-MCNC: 3.4 GM/DL
GLUCOSE BLD-MCNC: 126 MG/DL (ref 65–99)
HCT VFR BLD AUTO: 26.9 % (ref 37.5–51)
HEMOCCULT STL QL: POSITIVE
HGB BLD-MCNC: 7.1 G/DL (ref 13–17.7)
IMM GRANULOCYTES # BLD AUTO: 0.04 10*3/MM3 (ref 0–0.05)
IMM GRANULOCYTES NFR BLD AUTO: 0.6 % (ref 0–0.5)
INR PPP: 1.63 (ref 0.9–1.1)
LYMPHOCYTES # BLD AUTO: 1.09 10*3/MM3 (ref 0.7–3.1)
LYMPHOCYTES NFR BLD AUTO: 16.2 % (ref 19.6–45.3)
MCH RBC QN AUTO: 22.8 PG (ref 26.6–33)
MCHC RBC AUTO-ENTMCNC: 26.4 G/DL (ref 31.5–35.7)
MCV RBC AUTO: 86.5 FL (ref 79–97)
MONOCYTES # BLD AUTO: 0.5 10*3/MM3 (ref 0.1–0.9)
MONOCYTES NFR BLD AUTO: 7.4 % (ref 5–12)
NEUTROPHILS # BLD AUTO: 4.99 10*3/MM3 (ref 1.7–7)
NEUTROPHILS NFR BLD AUTO: 74.2 % (ref 42.7–76)
NRBC BLD AUTO-RTO: 0.1 /100 WBC (ref 0–0.2)
PLATELET # BLD AUTO: 281 10*3/MM3 (ref 140–450)
PMV BLD AUTO: 10.2 FL (ref 6–12)
POTASSIUM BLD-SCNC: 3.6 MMOL/L (ref 3.5–5.2)
PROT SERPL-MCNC: 6.7 G/DL (ref 6–8.5)
PROTHROMBIN TIME: 19 SECONDS (ref 11.7–14.2)
RBC # BLD AUTO: 3.11 10*6/MM3 (ref 4.14–5.8)
RH BLD: POSITIVE
SODIUM BLD-SCNC: 140 MMOL/L (ref 136–145)
T&S EXPIRATION DATE: NORMAL
TROPONIN T SERPL-MCNC: <0.01 NG/ML (ref 0–0.03)
WBC NRBC COR # BLD: 6.73 10*3/MM3 (ref 3.4–10.8)

## 2019-06-05 PROCEDURE — 86901 BLOOD TYPING SEROLOGIC RH(D): CPT

## 2019-06-05 PROCEDURE — 93005 ELECTROCARDIOGRAM TRACING: CPT | Performed by: EMERGENCY MEDICINE

## 2019-06-05 PROCEDURE — 86920 COMPATIBILITY TEST SPIN: CPT

## 2019-06-05 PROCEDURE — 99285 EMERGENCY DEPT VISIT HI MDM: CPT

## 2019-06-05 PROCEDURE — 82272 OCCULT BLD FECES 1-3 TESTS: CPT | Performed by: EMERGENCY MEDICINE

## 2019-06-05 PROCEDURE — 86850 RBC ANTIBODY SCREEN: CPT | Performed by: EMERGENCY MEDICINE

## 2019-06-05 PROCEDURE — 84484 ASSAY OF TROPONIN QUANT: CPT | Performed by: EMERGENCY MEDICINE

## 2019-06-05 PROCEDURE — 85610 PROTHROMBIN TIME: CPT | Performed by: EMERGENCY MEDICINE

## 2019-06-05 PROCEDURE — P9016 RBC LEUKOCYTES REDUCED: HCPCS

## 2019-06-05 PROCEDURE — 86900 BLOOD TYPING SEROLOGIC ABO: CPT | Performed by: EMERGENCY MEDICINE

## 2019-06-05 PROCEDURE — 86900 BLOOD TYPING SEROLOGIC ABO: CPT

## 2019-06-05 PROCEDURE — 93010 ELECTROCARDIOGRAM REPORT: CPT | Performed by: INTERNAL MEDICINE

## 2019-06-05 PROCEDURE — 86901 BLOOD TYPING SEROLOGIC RH(D): CPT | Performed by: EMERGENCY MEDICINE

## 2019-06-05 PROCEDURE — 85025 COMPLETE CBC W/AUTO DIFF WBC: CPT | Performed by: EMERGENCY MEDICINE

## 2019-06-05 PROCEDURE — 36430 TRANSFUSION BLD/BLD COMPNT: CPT

## 2019-06-05 PROCEDURE — 80053 COMPREHEN METABOLIC PANEL: CPT | Performed by: EMERGENCY MEDICINE

## 2019-06-05 PROCEDURE — 71046 X-RAY EXAM CHEST 2 VIEWS: CPT

## 2019-06-05 PROCEDURE — 93005 ELECTROCARDIOGRAM TRACING: CPT

## 2019-06-05 RX ORDER — SODIUM CHLORIDE 0.9 % (FLUSH) 0.9 %
10 SYRINGE (ML) INJECTION AS NEEDED
Status: DISCONTINUED | OUTPATIENT
Start: 2019-06-05 | End: 2019-06-07 | Stop reason: HOSPADM

## 2019-06-05 RX ORDER — PANTOPRAZOLE SODIUM 40 MG/10ML
80 INJECTION, POWDER, LYOPHILIZED, FOR SOLUTION INTRAVENOUS ONCE
Status: COMPLETED | OUTPATIENT
Start: 2019-06-05 | End: 2019-06-05

## 2019-06-05 RX ORDER — OMEPRAZOLE 40 MG/1
40 CAPSULE, DELAYED RELEASE ORAL DAILY
COMMUNITY
End: 2019-07-11

## 2019-06-05 RX ORDER — SODIUM CHLORIDE 0.9 % (FLUSH) 0.9 %
3-10 SYRINGE (ML) INJECTION AS NEEDED
Status: DISCONTINUED | OUTPATIENT
Start: 2019-06-05 | End: 2019-06-07 | Stop reason: HOSPADM

## 2019-06-05 RX ORDER — ACETAMINOPHEN 325 MG/1
650 TABLET ORAL EVERY 4 HOURS PRN
Status: DISCONTINUED | OUTPATIENT
Start: 2019-06-05 | End: 2019-06-07 | Stop reason: HOSPADM

## 2019-06-05 RX ORDER — METOPROLOL SUCCINATE 50 MG/1
150 TABLET, EXTENDED RELEASE ORAL DAILY
COMMUNITY
End: 2020-04-30

## 2019-06-05 RX ORDER — SODIUM CHLORIDE 0.9 % (FLUSH) 0.9 %
3 SYRINGE (ML) INJECTION EVERY 12 HOURS SCHEDULED
Status: DISCONTINUED | OUTPATIENT
Start: 2019-06-05 | End: 2019-06-07 | Stop reason: HOSPADM

## 2019-06-05 RX ORDER — HYDROCHLOROTHIAZIDE 25 MG/1
25 TABLET ORAL DAILY
COMMUNITY
End: 2019-07-25

## 2019-06-05 RX ORDER — HYDROCHLOROTHIAZIDE 25 MG/1
25 TABLET ORAL DAILY
Status: DISCONTINUED | OUTPATIENT
Start: 2019-06-06 | End: 2019-06-06

## 2019-06-05 RX ORDER — ONDANSETRON 2 MG/ML
4 INJECTION INTRAMUSCULAR; INTRAVENOUS EVERY 6 HOURS PRN
Status: DISCONTINUED | OUTPATIENT
Start: 2019-06-05 | End: 2019-06-07 | Stop reason: HOSPADM

## 2019-06-05 RX ORDER — FERROUS SULFATE 325(65) MG
325 TABLET ORAL 2 TIMES DAILY
COMMUNITY

## 2019-06-05 RX ADMIN — SODIUM CHLORIDE, PRESERVATIVE FREE 3 ML: 5 INJECTION INTRAVENOUS at 21:40

## 2019-06-05 RX ADMIN — PANTOPRAZOLE SODIUM 80 MG: 40 INJECTION, POWDER, FOR SOLUTION INTRAVENOUS at 19:34

## 2019-06-05 RX ADMIN — SODIUM CHLORIDE 8 MG/HR: 900 INJECTION INTRAVENOUS at 19:34
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
## 2019-06-05 NOTE — TELEPHONE ENCOUNTER
Tried calling the pt back, but no answer. Asked him to call the office at his earliest convenience.  Leigh Ann Espino  Triage RN

## 2019-06-05 NOTE — TELEPHONE ENCOUNTER
Pt calls to state that he has had intermittent rapid heart rate occurring several times over the past two months however today it began and still occurs, lasting for approximately 2 hours, he has not given any additional information, please call to obtain

## 2019-06-05 NOTE — PROGRESS NOTES
Clinical Pharmacy Services: Medication History    Henri Santana is a 74 y.o. male presenting to [facility] for   Chief Complaint   Patient presents with   • Chest Pain       He  has a past medical history of Abdominal bloating, Abnormal weight loss, Anemia, Atrial fibrillation (CMS/HCC), Atrial flutter (CMS/HCC), Benign essential hypertension, Chest pain, Cough, GERD (gastroesophageal reflux disease), Hyperglycemia, Hyperglyceridemia, Hyperlipidemia, Hypertension, Malignant neoplasm of prostate (CMS/HCC), Nephrolithiasis, Obesity, PAF (paroxysmal atrial fibrillation) (CMS/HCC), Prostate cancer (CMS/HCC), and SOB (shortness of breath).    Allergies as of 06/05/2019   • (No Known Allergies)       Medication information was obtained from: Patient, pharmacy  Pharmacy and Phone Number: [facility] [phone]    Prior to Admission Medications     Prescriptions Last Dose Informant Patient Reported? Taking?    albuterol (PROAIR RESPICLICK) 108 (90 Base) MCG/ACT inhaler  Self No Yes    Inhale 2 puffs Every 4 (Four) Hours As Needed for Wheezing or Shortness of Air.    apixaban (ELIQUIS) 5 MG tablet tablet  Self Yes Yes    Take 5 mg by mouth 2 (Two) Times a Day.    ferrous sulfate 325 (65 FE) MG tablet  Self Yes Yes    Take 325 mg by mouth Daily.    hydrochlorothiazide (HYDRODIURIL) 25 MG tablet  Pharmacy Yes Yes    Take 25 mg by mouth Daily.    metoprolol succinate XL (TOPROL-XL) 50 MG 24 hr tablet  Self Yes Yes    Take 150 mg by mouth Daily.    omeprazole (priLOSEC) 40 MG capsule  Self Yes Yes    Take 40 mg by mouth Daily.    potassium gluconate (EQL POTASSIUM GLUCONATE) 595 MG tablet tablet  Self Yes Yes    Take 595 mg by mouth daily.            Medication notes: Lasix removed per patient, he is taking HCTZ (added). Debrox and Simethicone removed, therapy complete.    This medication list is complete to the best of my knowledge as of 6/5/2019    Please call if questions.    Yoselin Reyes, Medication History  Technician  6/5/2019 7:49 PM

## 2019-06-05 NOTE — TELEPHONE ENCOUNTER
06/05/19  3:05 PM  Henri Santana  1945  Home Phone [phone]   Mobile [phone]       Henri Santana is a patient of Dr. Casiano. Called the pt back, per VM from Heather ESPINOZA Pt is having Tachycardia and hypertension. Current HR is 120-130s and BP is 199/78. This has been happening for 3 hours now. He said it typically does not last his long. W/these readings, he is SOA, feels midsternal tightness, and feels some heartburn right over where esophagus meets the stomach.    Current cardiac meds are:    Metoprolol succinate XL 150mg daily  Furosemide 40mg daily  Apixaban 5mg BID  Potassium gluconate 595mg daily    Dr. Casiano: How would you like to proceed w/this pt?    Leigh Ann Hankins RN

## 2019-06-06 ENCOUNTER — APPOINTMENT (OUTPATIENT)
Dept: NUCLEAR MEDICINE | Facility: HOSPITAL | Age: 74
End: 2019-06-06

## 2019-06-06 ENCOUNTER — APPOINTMENT (OUTPATIENT)
Dept: CT IMAGING | Facility: HOSPITAL | Age: 74
End: 2019-06-06

## 2019-06-06 PROBLEM — R19.5 HEME POSITIVE STOOL: Status: ACTIVE | Noted: 2019-06-05

## 2019-06-06 PROBLEM — K21.9 GASTROESOPHAGEAL REFLUX DISEASE: Status: ACTIVE | Noted: 2019-06-05

## 2019-06-06 PROBLEM — D68.32 HEMORRHAGIC DISORDER DUE TO EXTRINSIC CIRCULATING ANTICOAGULANTS (HCC): Status: ACTIVE | Noted: 2019-06-06

## 2019-06-06 PROBLEM — D50.9 IRON DEFICIENCY ANEMIA: Status: ACTIVE | Noted: 2019-06-05

## 2019-06-06 PROBLEM — D62 ACUTE POSTHEMORRHAGIC ANEMIA: Status: ACTIVE | Noted: 2019-06-06

## 2019-06-06 LAB
ABO + RH BLD: NORMAL
ABO + RH BLD: NORMAL
ALBUMIN SERPL-MCNC: 3.7 G/DL (ref 3.5–5.2)
ALBUMIN/GLOB SERPL: 1.1 G/DL
ALP SERPL-CCNC: 123 U/L (ref 39–117)
ALT SERPL W P-5'-P-CCNC: 17 U/L (ref 1–41)
ANION GAP SERPL CALCULATED.3IONS-SCNC: 12.6 MMOL/L
AST SERPL-CCNC: 35 U/L (ref 1–40)
BASOPHILS # BLD AUTO: 0.07 10*3/MM3 (ref 0–0.2)
BASOPHILS NFR BLD AUTO: 1 % (ref 0–1.5)
BH BB BLOOD EXPIRATION DATE: NORMAL
BH BB BLOOD EXPIRATION DATE: NORMAL
BH BB BLOOD TYPE BARCODE: 5100
BH BB BLOOD TYPE BARCODE: 5100
BH BB DISPENSE STATUS: NORMAL
BH BB DISPENSE STATUS: NORMAL
BH BB PRODUCT CODE: NORMAL
BH BB PRODUCT CODE: NORMAL
BH BB UNIT NUMBER: NORMAL
BH BB UNIT NUMBER: NORMAL
BH CV STRESS COMMENTS STAGE 1: NORMAL
BH CV STRESS DOSE REGADENOSON STAGE 1: 0.4
BH CV STRESS DURATION MIN STAGE 1: 0
BH CV STRESS DURATION SEC STAGE 1: 10
BH CV STRESS PROTOCOL 1: NORMAL
BH CV STRESS RECOVERY BP: NORMAL MMHG
BH CV STRESS RECOVERY HR: 60 BPM
BH CV STRESS STAGE 1: 1
BILIRUB SERPL-MCNC: 1.2 MG/DL (ref 0.2–1.2)
BUN BLD-MCNC: 13 MG/DL (ref 8–23)
BUN/CREAT SERPL: 11.4 (ref 7–25)
CALCIUM SPEC-SCNC: 8.7 MG/DL (ref 8.6–10.5)
CHLORIDE SERPL-SCNC: 105 MMOL/L (ref 98–107)
CO2 SERPL-SCNC: 26.4 MMOL/L (ref 22–29)
CREAT BLD-MCNC: 1.14 MG/DL (ref 0.76–1.27)
CROSSMATCH INTERPRETATION: NORMAL
CROSSMATCH INTERPRETATION: NORMAL
DEPRECATED RDW RBC AUTO: 48.6 FL (ref 37–54)
EOSINOPHIL # BLD AUTO: 0.12 10*3/MM3 (ref 0–0.4)
EOSINOPHIL NFR BLD AUTO: 1.7 % (ref 0.3–6.2)
ERYTHROCYTE [DISTWIDTH] IN BLOOD BY AUTOMATED COUNT: 15.9 % (ref 12.3–15.4)
GFR SERPL CREATININE-BSD FRML MDRD: 63 ML/MIN/1.73
GLOBULIN UR ELPH-MCNC: 3.3 GM/DL
GLUCOSE BLD-MCNC: 138 MG/DL (ref 65–99)
HCT VFR BLD AUTO: 32.8 % (ref 37.5–51)
HCT VFR BLD AUTO: 33.9 % (ref 37.5–51)
HGB BLD-MCNC: 9.1 G/DL (ref 13–17.7)
HGB BLD-MCNC: 9.4 G/DL (ref 13–17.7)
IMM GRANULOCYTES # BLD AUTO: 0.04 10*3/MM3 (ref 0–0.05)
IMM GRANULOCYTES NFR BLD AUTO: 0.6 % (ref 0–0.5)
LV EF NUC BP: 66 %
LYMPHOCYTES # BLD AUTO: 1.12 10*3/MM3 (ref 0.7–3.1)
LYMPHOCYTES NFR BLD AUTO: 16 % (ref 19.6–45.3)
MAXIMAL PREDICTED HEART RATE: 148 BPM
MCH RBC QN AUTO: 23.6 PG (ref 26.6–33)
MCHC RBC AUTO-ENTMCNC: 27.7 G/DL (ref 31.5–35.7)
MCV RBC AUTO: 85 FL (ref 79–97)
MONOCYTES # BLD AUTO: 0.52 10*3/MM3 (ref 0.1–0.9)
MONOCYTES NFR BLD AUTO: 7.4 % (ref 5–12)
NEUTROPHILS # BLD AUTO: 5.15 10*3/MM3 (ref 1.7–7)
NEUTROPHILS NFR BLD AUTO: 73.3 % (ref 42.7–76)
NRBC BLD AUTO-RTO: 0.1 /100 WBC (ref 0–0.2)
NT-PROBNP SERPL-MCNC: 295.2 PG/ML (ref 5–900)
PERCENT MAX PREDICTED HR: 45.95 %
PLATELET # BLD AUTO: 270 10*3/MM3 (ref 140–450)
PMV BLD AUTO: 10.5 FL (ref 6–12)
POTASSIUM BLD-SCNC: 3.8 MMOL/L (ref 3.5–5.2)
PROT SERPL-MCNC: 7 G/DL (ref 6–8.5)
RBC # BLD AUTO: 3.86 10*6/MM3 (ref 4.14–5.8)
SODIUM BLD-SCNC: 144 MMOL/L (ref 136–145)
STRESS BASELINE BP: NORMAL MMHG
STRESS BASELINE HR: 55 BPM
STRESS PERCENT HR: 54 %
STRESS POST PEAK BP: NORMAL MMHG
STRESS POST PEAK HR: 68 BPM
STRESS TARGET HR: 126 BPM
UNIT  ABO: NORMAL
UNIT  ABO: NORMAL
UNIT  RH: NORMAL
UNIT  RH: NORMAL
WBC NRBC COR # BLD: 7.02 10*3/MM3 (ref 3.4–10.8)

## 2019-06-06 PROCEDURE — 85014 HEMATOCRIT: CPT | Performed by: HOSPITALIST

## 2019-06-06 PROCEDURE — 36430 TRANSFUSION BLD/BLD COMPNT: CPT

## 2019-06-06 PROCEDURE — 83880 ASSAY OF NATRIURETIC PEPTIDE: CPT | Performed by: INTERNAL MEDICINE

## 2019-06-06 PROCEDURE — A9500 TC99M SESTAMIBI: HCPCS | Performed by: INTERNAL MEDICINE

## 2019-06-06 PROCEDURE — 93017 CV STRESS TEST TRACING ONLY: CPT

## 2019-06-06 PROCEDURE — 78452 HT MUSCLE IMAGE SPECT MULT: CPT | Performed by: INTERNAL MEDICINE

## 2019-06-06 PROCEDURE — 80053 COMPREHEN METABOLIC PANEL: CPT | Performed by: HOSPITALIST

## 2019-06-06 PROCEDURE — 93018 CV STRESS TEST I&R ONLY: CPT | Performed by: INTERNAL MEDICINE

## 2019-06-06 PROCEDURE — 0 IOPAMIDOL PER 1 ML: Performed by: HOSPITALIST

## 2019-06-06 PROCEDURE — 99222 1ST HOSP IP/OBS MODERATE 55: CPT | Performed by: INTERNAL MEDICINE

## 2019-06-06 PROCEDURE — P9016 RBC LEUKOCYTES REDUCED: HCPCS

## 2019-06-06 PROCEDURE — 85025 COMPLETE CBC W/AUTO DIFF WBC: CPT | Performed by: HOSPITALIST

## 2019-06-06 PROCEDURE — 85018 HEMOGLOBIN: CPT | Performed by: HOSPITALIST

## 2019-06-06 PROCEDURE — 0 TECHNETIUM SESTAMIBI: Performed by: INTERNAL MEDICINE

## 2019-06-06 PROCEDURE — 86900 BLOOD TYPING SEROLOGIC ABO: CPT

## 2019-06-06 PROCEDURE — 71275 CT ANGIOGRAPHY CHEST: CPT

## 2019-06-06 PROCEDURE — 78452 HT MUSCLE IMAGE SPECT MULT: CPT

## 2019-06-06 PROCEDURE — 25010000002 REGADENOSON 0.4 MG/5ML SOLUTION: Performed by: INTERNAL MEDICINE

## 2019-06-06 PROCEDURE — 99221 1ST HOSP IP/OBS SF/LOW 40: CPT | Performed by: INTERNAL MEDICINE

## 2019-06-06 PROCEDURE — 93016 CV STRESS TEST SUPVJ ONLY: CPT | Performed by: INTERNAL MEDICINE

## 2019-06-06 RX ORDER — HYDROCHLOROTHIAZIDE 25 MG/1
37.5 TABLET ORAL DAILY
Status: DISCONTINUED | OUTPATIENT
Start: 2019-06-07 | End: 2019-06-07 | Stop reason: HOSPADM

## 2019-06-06 RX ADMIN — ACETAMINOPHEN 650 MG: 325 TABLET, FILM COATED ORAL at 08:39

## 2019-06-06 RX ADMIN — TECHNETIUM TC 99M SESTAMIBI 1 DOSE: 1 INJECTION INTRAVENOUS at 13:45

## 2019-06-06 RX ADMIN — POLYETHYLENE GLYCOL 3350 119 G: 17 POWDER, FOR SOLUTION ORAL at 15:16

## 2019-06-06 RX ADMIN — REGADENOSON 0.4 MG: 0.08 INJECTION, SOLUTION INTRAVENOUS at 14:22

## 2019-06-06 RX ADMIN — SODIUM CHLORIDE 8 MG/HR: 900 INJECTION INTRAVENOUS at 20:56

## 2019-06-06 RX ADMIN — POLYETHYLENE GLYCOL 3350 3966.67 ML: 17 POWDER, FOR SOLUTION ORAL at 20:52

## 2019-06-06 RX ADMIN — SODIUM CHLORIDE 8 MG/HR: 900 INJECTION INTRAVENOUS at 15:19

## 2019-06-06 RX ADMIN — SODIUM CHLORIDE, PRESERVATIVE FREE 3 ML: 5 INJECTION INTRAVENOUS at 21:43

## 2019-06-06 RX ADMIN — SODIUM CHLORIDE 8 MG/HR: 900 INJECTION INTRAVENOUS at 10:16

## 2019-06-06 RX ADMIN — TECHNETIUM TC 99M SESTAMIBI 1 DOSE: 1 INJECTION INTRAVENOUS at 14:21

## 2019-06-06 RX ADMIN — IOPAMIDOL 95 ML: 755 INJECTION, SOLUTION INTRAVENOUS at 11:14

## 2019-06-06 RX ADMIN — METOPROLOL SUCCINATE 150 MG: 100 TABLET, FILM COATED, EXTENDED RELEASE ORAL at 08:32

## 2019-06-06 RX ADMIN — SODIUM CHLORIDE 8 MG/HR: 900 INJECTION INTRAVENOUS at 00:49

## 2019-06-06 RX ADMIN — SODIUM CHLORIDE 8 MG/HR: 900 INJECTION INTRAVENOUS at 05:58

## 2019-06-06 RX ADMIN — HYDROCHLOROTHIAZIDE 25 MG: 25 TABLET ORAL at 08:32

## 2019-06-06 NOTE — PROGRESS NOTES
Name: Henri Santana ADMIT: 2019   : 1945  PCP: Loki Diane MD    MRN: [id number] LOS: 1 days   AGE/SEX: 74 y.o. male  ROOM: [facility]     Subjective   Subjective   Feels okay today.  No chest pain.  Dark stools so far with his prep.  No hematemesis.     Objective   Objective   Vital Signs  Temp:  [97.5 °F (36.4 °C)-98.1 °F (36.7 °C)] 97.8 °F (36.6 °C)  Heart Rate:  [59-66] 59  Resp:  [16-17] 16  BP: (120-152)/(51-68) 134/57  Body mass index is 34.21 kg/m².    Physical Exam   Constitutional: He is oriented to person, place, and time. He appears well-developed. He is cooperative. No distress.   Neck: No JVD present. No tracheal deviation present.   Cardiovascular: Normal rate and regular rhythm.   Murmur heard.  Pulmonary/Chest: Effort normal and breath sounds normal. No respiratory distress.   Abdominal: Soft. Normal appearance and bowel sounds are normal. He exhibits no distension. There is no tenderness.   Musculoskeletal: He exhibits edema.   Neurological: He is alert and oriented to person, place, and time.   Skin: Skin is warm and dry.   Psychiatric: He has a normal mood and affect. His behavior is normal.   Nursing note and vitals reviewed.      Results Review:       I reviewed the patient's new clinical results.  Results from last 7 days   Lab Units 19  1533 19  0749 19  1810   WBC 10*3/mm3  --  7.02 6.73   HEMOGLOBIN g/dL 9.4* 9.1* 7.1*   PLATELETS 10*3/mm3  --  270 281     Results from last 7 days   Lab Units 19  0749 19  1810   SODIUM mmol/L 144 140   POTASSIUM mmol/L 3.8 3.6   CHLORIDE mmol/L 105 103   CO2 mmol/L 26.4 24.1   BUN mg/dL 13 12   CREATININE mg/dL 1.14 1.02   GLUCOSE mg/dL 138* 126*   Estimated Creatinine Clearance: 72.1 mL/min (by C-G formula based on SCr of 1.14 mg/dL).  Results from last 7 days   Lab Units 19  0749 19  1810   CALCIUM mg/dL 8.7 9.0   ALBUMIN g/dL 3.70 3.30*     Results from last 7 days   Lab Units 19  1810   PROTIME  Seconds 19.0*   INR  1.63*     Results from last 7 days   Lab Units 06/05/19  1848   OCCULT BLOOD, FECAL  Positive*       EGD  Impression:    Recommendation:    COLONOSCOPY  Impression:    Recommendation:      [START ON 6/7/2019] hydrochlorothiazide 37.5 mg Oral Daily   metoprolol succinate  mg Oral Daily   polyethylene glycol 119 g Oral BID   sodium chloride 3 mL Intravenous Q12H       pantoprazole 8 mg/hr Last Rate: 8 mg/hr (06/06/19 1519)            Assessment/Plan     Active Hospital Problems    Diagnosis  POA   • **UGI bleed [K92.2]  Yes   • Hemorrhagic disorder due to extrinsic circulating anticoagulants (CMS/HCC) [D68.32]  Yes   • Acute posthemorrhagic anemia [D62]  Yes   • Symptomatic anemia [D64.9]  Yes   • Chest pain, atypical [R07.89]  Yes   • Chronic anticoagulation [Z79.01]  Not Applicable   • Chronic diastolic CHF (congestive heart failure) (CMS/HCC) [I50.32]  Yes   • Heme positive stool [R19.5]  Yes   • Gastroesophageal reflux disease [K21.9]  Yes   • Iron deficiency anemia [D50.9]  Yes   • Obstructive sleep apnea [G47.33]  Yes   • Atrial fibrillation (CMS/HCC) [I48.91]  Yes   • Acid reflux [K21.9]  Yes   • Benign essential HTN [I10]  Yes      Resolved Hospital Problems   No resolved problems to display.       Mr. Santana is a 74 y.o. former smoker with a history of atrial fibrillation on Eliquis who is admitted with acute GI hemorrhage.  The source of the bleeding is difficult to determine at this time.      · Admitted to telemetry unit.  · GI is planning EGD and colonoscopy.   · Continue to monitor vital signs, serial H/H, telemetry and frequency of melena.  Diet Clear Liquid  · NPO Diet for now.  · Volume resuscitation with normal saline bolus.  · Correct coagulopathy with discontinuation of Eliquis.  · PPI using PROTONIX 80 mg IV bolus and 8 mg/hr infusion  · He has been seen by cardiology.  Stress test is negative and CTA of chest negative for pulmonary embolus.  · DVT prophylaxis with  SCDs      Demetri Conley MD  [facility]  06/06/19  5:58 PM

## 2019-06-06 NOTE — PLAN OF CARE
"Problem: Patient Care Overview  Goal: Plan of Care Review  Outcome: Ongoing (interventions implemented as appropriate)   06/06/19 0502   Coping/Psychosocial   Plan of Care Reviewed With patient   Plan of Care Review   Progress improving   OTHER   Outcome Summary Pt denies chest painn and discomfort, dizziness, SOA, n/v/d. Pt c/o very mild abd gas discomfort 1x. Had 2 light colored bm's which visibly showed no s/s of bleeding. 2u PRBC given with no adverse effects noted. Pt stated she feels \" perky and back to normal\" post transfusion.        Problem: Gastrointestinal Bleeding (Adult)  Goal: Signs and Symptoms of Listed Potential Problems Will be Absent, Minimized or Managed (Gastrointestinal Bleeding)  Outcome: Ongoing (interventions implemented as appropriate)      Problem: Pain, Acute (Adult)  Goal: Identify Related Risk Factors and Signs and Symptoms  Outcome: Outcome(s) achieved Date Met: 06/06/19    Goal: Acceptable Pain Control/Comfort Level  Outcome: Ongoing (interventions implemented as appropriate)      Problem: Anemia (Adult)  Goal: Identify Related Risk Factors and Signs and Symptoms  Outcome: Outcome(s) achieved Date Met: 06/06/19    Goal: Symptom Improvement  Outcome: Ongoing (interventions implemented as appropriate)        "

## 2019-06-06 NOTE — PLAN OF CARE
Problem: Patient Care Overview  Goal: Plan of Care Review  Outcome: Ongoing (interventions implemented as appropriate)   06/06/19 7809   Coping/Psychosocial   Plan of Care Reviewed With patient   Plan of Care Review   Progress improving   OTHER   Outcome Summary VSS. No c/o pain. No signs of blood in stool. CTA chest and stress test complete. Plans for EGD/Cscope tomorrow. Consent in chart. Prep started. NPO after MN. Up ad ángel. Continue to monitor.

## 2019-06-06 NOTE — CONSULTS
[facility]  Initial Inpatient Consult Note    Referring Provider: Dr. Loki Diane    Reason for Consultation: Anemia, heme positive stool    Subjective     History of present illness:    74 y.o. male with prior history of iron deficiency anemia, underwent upper and lower endoscopies in 2016 without obvious bleeding source.  History of prior right hemicolectomy for cecal mass in 2015.  Admitted for chest pain, does describe intermittent dark stools over the past several months.  Hemoglobin on presentation 7.1, improved posttransfusion to 9.1.  He admits to occasional shortness of breath and substernal chest pain which he did treat with a histamine blocker that afforded transient relief.  Hemoglobin last checked in January was 10.7.  He describes some abdominal bloating but no specific abdominal pains.  Patient is on Eliquis for atrial fibrillation.  Last dose taken yesterday.    Past Medical History:  Past Medical History:   Diagnosis Date   • Abdominal bloating    • Abnormal weight loss    • Anemia     blood loss   • Atrial fibrillation (CMS/HCC)    • Atrial flutter (CMS/HCC)     persistent   • Benign essential hypertension    • Chest pain    • Cough    • GERD (gastroesophageal reflux disease)    • Hyperglycemia    • Hyperglyceridemia    • Hyperlipidemia    • Hypertension    • Malignant neoplasm of prostate (CMS/HCC)    • Nephrolithiasis    • Obesity    • PAF (paroxysmal atrial fibrillation) (CMS/HCC)    • Prostate cancer (CMS/HCC)    • SOB (shortness of breath)      Past Surgical History:  Past Surgical History:   Procedure Laterality Date   • COLECTOMY PARTIAL / TOTAL     • COLON SURGERY      colon resection 2015   • COLONOSCOPY N/A 4/14/2016    Procedure: COLONOSCOPY TO ILEOCOLIC ANASTOMOSIS WITH POLYPECTOMY;  Surgeon: Geraldo Azul MD;  Location: [facility];  Service:    • ENDOSCOPY  4/14/2016    Procedure: ESOPHAGOGASTRODUODENOSCOPY WITH BIOPSIES;  Surgeon: Geraldo Azul MD;   Location: [facility] ENDOSCOPY;  Service:    • MOUTH SURGERY     • PROSTATE SURGERY        Social History:   Social History     Tobacco Use   • Smoking status: Former Smoker     Packs/day: 1.00     Years: 30.00     Pack years: 30.00     Types: Cigarettes     Last attempt to quit: 1967     Years since quittin.4   • Smokeless tobacco: Former User   Substance Use Topics   • Alcohol use: Yes     Comment: social      Family History:  Family History   Problem Relation Age of Onset   • Cancer Other    • Hypertension Other    • Stroke Other    • Heart attack Other    • Heart disease Mother    • Hypertension Mother    • Diabetes Mother    • Cancer Mother    • Heart disease Father    • Stroke Father    • Hypertension Father    • Cancer Father        Home Meds:  Medications Prior to Admission   Medication Sig Dispense Refill Last Dose   • albuterol (PROAIR RESPICLICK) 108 (90 Base) MCG/ACT inhaler Inhale 2 puffs Every 4 (Four) Hours As Needed for Wheezing or Shortness of Air. 1 inhaler 11 2019 at Unknown time   • apixaban (ELIQUIS) 5 MG tablet tablet Take 5 mg by mouth 2 (Two) Times a Day.   2019 at Unknown time   • ferrous sulfate 325 (65 FE) MG tablet Take 325 mg by mouth Daily.   2019 at Unknown time   • hydrochlorothiazide (HYDRODIURIL) 25 MG tablet Take 25 mg by mouth Daily.   2019 at Unknown time   • metoprolol succinate XL (TOPROL-XL) 50 MG 24 hr tablet Take 150 mg by mouth Daily.   2019 at Unknown time   • omeprazole (priLOSEC) 40 MG capsule Take 40 mg by mouth Daily.   2019 at Unknown time   • potassium gluconate (EQL POTASSIUM GLUCONATE) 595 MG tablet tablet Take 595 mg by mouth daily.   2019 at Unknown time     Current Meds:     hydrochlorothiazide 25 mg Oral Daily   metoprolol succinate  mg Oral Daily   sodium chloride 3 mL Intravenous Q12H     Allergies:  No Known Allergies  Review of Systems  Pertinent items are noted in HPI, all other systems reviewed and negative      Objective     Vital Signs  Temp:  [97.5 °F (36.4 °C)-98.8 °F (37.1 °C)] 97.8 °F (36.6 °C)  Heart Rate:  [59-86] 59  Resp:  [16-17] 16  BP: (120-152)/(51-68) 134/57  Physical Exam:  General Appearance:    Alert, cooperative, in no acute distress   Head:    Normocephalic, without obvious abnormality, atraumatic   Eyes:            Lids and lashes normal, conjunctivae and sclerae normal, no   icterus   Throat:   No oral lesions, no thrush, oral mucosa moist   Neck:   No adenopathy, supple, trachea midline, no thyromegaly, no   carotid bruit, no JVD   Lungs:     Clear to auscultation,respirations regular, even and                   unlabored    Heart:    Regular rhythm and normal rate, normal S1 and S2, no            murmur, no gallop, no rub, no click   Chest Wall:    No abnormalities observed   Abdomen:     Normal bowel sounds, no masses, no organomegaly, soft        non-tender, non-distended, no guarding, no rebound                 tenderness   Rectal:     Deferred   Extremities:   no edema, no cyanosis, no redness   Skin:   No bleeding, bruising or rash   Lymph nodes:   No palpable adenopathy   Psychiatric:  Judgement and insight: normal   Orientation to person place and time: normal   Mood and affect: normal   Results Review:   I reviewed the patient's new clinical results.    Results from last 7 days   Lab Units 06/06/19  0749 06/05/19  1810   WBC 10*3/mm3 7.02 6.73   HEMOGLOBIN g/dL 9.1* 7.1*   HEMATOCRIT % 32.8* 26.9*   PLATELETS 10*3/mm3 270 281     Results from last 7 days   Lab Units 06/06/19  0749 06/05/19  1810   SODIUM mmol/L 144 140   POTASSIUM mmol/L 3.8 3.6   CHLORIDE mmol/L 105 103   CO2 mmol/L 26.4 24.1   BUN mg/dL 13 12   CREATININE mg/dL 1.14 1.02   CALCIUM mg/dL 8.7 9.0   BILIRUBIN mg/dL 1.2 0.8   ALK PHOS U/L 123* 116   ALT (SGPT) U/L 17 18   AST (SGOT) U/L 35 34   GLUCOSE mg/dL 138* 126*     Results from last 7 days   Lab Units 06/05/19  1810   INR  1.63*     No results found for:  LIPASE    Radiology:  XR Chest 2 View   Preliminary Result   No active disease is seen in the chest with no change when compared to   prior chest x-ray 04/11/2016              Assessment/Plan   Patient Active Problem List   Diagnosis   • Benign essential HTN   • Acid reflux   • Blood glucose elevated   • HLD (hyperlipidemia)   • CA of prostate (CMS/HCC)   • Class 2 obesity due to excess calories with serious comorbidity in adult   • Atrial flutter (CMS/HCC)   • Atrial fibrillation (CMS/HCC)   • Hypersomnolence   • Obstructive sleep apnea   • Anemia   • History of anemia   • Acute URI   • UGI bleed   • Symptomatic anemia   • Chest pain, atypical   • Chronic anticoagulation   • Chronic diastolic CHF (congestive heart failure) (CMS/HCC)     Impression  #1 anemia: Chronic issues but recent acute exacerbation  #2 heme positive stool  #3 history of cecal neoplasm status post right hemicolectomy  #4 atrial fibrillation requiring anticoagulation  #5 congestive heart failure      Recommendation  Would offer EGD and colonoscopy once cleared by cardiology  May warrant capsule enteroscopy pending endoscopy findings    I discussed the patients findings and my recommendations with patient and consulting provider.    Neftali Cheema MD
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
## 2019-06-06 NOTE — PROGRESS NOTES
Discharge Planning Assessment  [facility]     Patient Name: Henri Santana  MRN: [id number]  Today's Date: 6/6/2019    Admit Date: 6/5/2019    Discharge Needs Assessment     Row Name 06/06/19 1517       Living Environment    Lives With  spouse    Name(s) of Who Lives With Patient  wife, Myrna Santana, [phone]    Current Living Arrangements  home/apartment/condo    Primary Care Provided by  self    Provides Primary Care For  no one    Family Caregiver if Needed  spouse    Quality of Family Relationships  helpful;supportive;involved    Able to Return to Prior Arrangements  yes       Resource/Environmental Concerns    Resource/Environmental Concerns  none       Transition Planning    Patient/Family Anticipates Transition to  home with family    Patient/Family Anticipated Services at Transition  none    Transportation Anticipated  family or friend will provide       Discharge Needs Assessment    Concerns to be Addressed  discharge planning    Equipment Currently Used at Home  none    Discharge Coordination/Progress  Home        Discharge Plan     Row Name 06/06/19 151       Plan    Plan  Home    Patient/Family in Agreement with Plan  yes    Plan Comments  IMM letter checked. CCP met with pt and wife (Myrna Santana, [phone]) to discuss d/c planning. Facesheet verified. CCP role explained. Pt resides with his wife in a multi level home and past sub-acute rehab. Pt has used home health but cannot recall agency name. Pt confirms pharmacy is [facility] on [address]. Pt denies known needs at this time. CCP to follow to assist should d/c needs arise. Montserrat Rosas [facility]        Destination      No service coordination in this encounter.      Durable Medical Equipment      No service coordination in this encounter.      Dialysis/Infusion      No service coordination in this encounter.      Home Medical Care      No service coordination in this encounter.      Therapy      No service coordination in this encounter.      Community  Resources      No service coordination in this encounter.          Demographic Summary     Row Name 06/06/19 1516       General Information    Admission Type  inpatient    Arrived From  home    Required Notices Provided  Important Message from Medicare    Referral Source  admission list    Reason for Consult  discharge planning    Preferred Language  English        Functional Status     Row Name 06/06/19 1517       Functional Status    Usual Activity Tolerance  good    Current Activity Tolerance  good       Functional Status, IADL    Medications  independent    Meal Preparation  independent    Housekeeping  independent    Laundry  independent    Shopping  independent       Mental Status Summary    Recent Changes in Mental Status/Cognitive Functioning  no changes        Psychosocial    No documentation.       Abuse/Neglect    No documentation.       Legal    No documentation.       Substance Abuse    No documentation.       Patient Forms    No documentation.           Oliva Rosas LCSW
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
## 2019-06-07 ENCOUNTER — ANESTHESIA EVENT (OUTPATIENT)
Dept: GASTROENTEROLOGY | Facility: HOSPITAL | Age: 74
End: 2019-06-07

## 2019-06-07 ENCOUNTER — TELEPHONE (OUTPATIENT)
Dept: GASTROENTEROLOGY | Facility: CLINIC | Age: 74
End: 2019-06-07

## 2019-06-07 ENCOUNTER — ANESTHESIA (OUTPATIENT)
Dept: GASTROENTEROLOGY | Facility: HOSPITAL | Age: 74
End: 2019-06-07

## 2019-06-07 ENCOUNTER — APPOINTMENT (OUTPATIENT)
Dept: ULTRASOUND IMAGING | Facility: HOSPITAL | Age: 74
End: 2019-06-07

## 2019-06-07 VITALS
RESPIRATION RATE: 16 BRPM | BODY MASS INDEX: 33.97 KG/M2 | TEMPERATURE: 97.8 F | HEIGHT: 70 IN | OXYGEN SATURATION: 99 % | HEART RATE: 59 BPM | WEIGHT: 237.3 LBS | SYSTOLIC BLOOD PRESSURE: 143 MMHG | DIASTOLIC BLOOD PRESSURE: 60 MMHG

## 2019-06-07 PROBLEM — K57.30 DIVERTICULOSIS OF LARGE INTESTINE WITHOUT DIVERTICULITIS: Status: ACTIVE | Noted: 2019-06-07

## 2019-06-07 PROBLEM — D50.0 IRON DEFICIENCY ANEMIA DUE TO CHRONIC BLOOD LOSS: Status: ACTIVE | Noted: 2019-06-05

## 2019-06-07 PROBLEM — K64.1 SECOND DEGREE HEMORRHOIDS: Status: ACTIVE | Noted: 2019-06-07

## 2019-06-07 PROBLEM — K31.7 GASTRIC POLYP: Status: ACTIVE | Noted: 2019-06-07

## 2019-06-07 PROBLEM — D17.5 LIPOMA OF INTRA-ABDOMINAL ORGANS: Status: ACTIVE | Noted: 2019-06-07

## 2019-06-07 LAB
ANION GAP SERPL CALCULATED.3IONS-SCNC: 14 MMOL/L
BUN BLD-MCNC: 10 MG/DL (ref 8–23)
BUN/CREAT SERPL: 10.1 (ref 7–25)
CALCIUM SPEC-SCNC: 8.9 MG/DL (ref 8.6–10.5)
CHLORIDE SERPL-SCNC: 102 MMOL/L (ref 98–107)
CO2 SERPL-SCNC: 23 MMOL/L (ref 22–29)
CREAT BLD-MCNC: 0.99 MG/DL (ref 0.76–1.27)
GFR SERPL CREATININE-BSD FRML MDRD: 74 ML/MIN/1.73
GLUCOSE BLD-MCNC: 143 MG/DL (ref 65–99)
HCT VFR BLD AUTO: 32.2 % (ref 37.5–51)
HCT VFR BLD AUTO: 32.7 % (ref 37.5–51)
HGB BLD-MCNC: 9.2 G/DL (ref 13–17.7)
HGB BLD-MCNC: 9.4 G/DL (ref 13–17.7)
POTASSIUM BLD-SCNC: 3.5 MMOL/L (ref 3.5–5.2)
SODIUM BLD-SCNC: 139 MMOL/L (ref 136–145)

## 2019-06-07 PROCEDURE — 0DJ08ZZ INSPECTION OF UPPER INTESTINAL TRACT, VIA NATURAL OR ARTIFICIAL OPENING ENDOSCOPIC: ICD-10-PCS | Performed by: INTERNAL MEDICINE

## 2019-06-07 PROCEDURE — 43235 EGD DIAGNOSTIC BRUSH WASH: CPT | Performed by: INTERNAL MEDICINE

## 2019-06-07 PROCEDURE — 85018 HEMOGLOBIN: CPT | Performed by: HOSPITALIST

## 2019-06-07 PROCEDURE — 76705 ECHO EXAM OF ABDOMEN: CPT

## 2019-06-07 PROCEDURE — 80048 BASIC METABOLIC PNL TOTAL CA: CPT | Performed by: HOSPITALIST

## 2019-06-07 PROCEDURE — 0DBM8ZZ EXCISION OF DESCENDING COLON, VIA NATURAL OR ARTIFICIAL OPENING ENDOSCOPIC: ICD-10-PCS | Performed by: INTERNAL MEDICINE

## 2019-06-07 PROCEDURE — 45385 COLONOSCOPY W/LESION REMOVAL: CPT | Performed by: INTERNAL MEDICINE

## 2019-06-07 PROCEDURE — 85014 HEMATOCRIT: CPT | Performed by: HOSPITALIST

## 2019-06-07 PROCEDURE — 25010000002 PROPOFOL 10 MG/ML EMULSION: Performed by: ANESTHESIOLOGY

## 2019-06-07 PROCEDURE — 88305 TISSUE EXAM BY PATHOLOGIST: CPT | Performed by: INTERNAL MEDICINE

## 2019-06-07 PROCEDURE — 99232 SBSQ HOSP IP/OBS MODERATE 35: CPT | Performed by: NURSE PRACTITIONER

## 2019-06-07 RX ORDER — LIDOCAINE HYDROCHLORIDE 20 MG/ML
INJECTION, SOLUTION INFILTRATION; PERINEURAL AS NEEDED
Status: DISCONTINUED | OUTPATIENT
Start: 2019-06-07 | End: 2019-06-07 | Stop reason: SURG

## 2019-06-07 RX ORDER — SODIUM CHLORIDE, SODIUM LACTATE, POTASSIUM CHLORIDE, CALCIUM CHLORIDE 600; 310; 30; 20 MG/100ML; MG/100ML; MG/100ML; MG/100ML
1000 INJECTION, SOLUTION INTRAVENOUS CONTINUOUS
Status: DISCONTINUED | OUTPATIENT
Start: 2019-06-07 | End: 2019-06-07

## 2019-06-07 RX ORDER — PROPOFOL 10 MG/ML
VIAL (ML) INTRAVENOUS CONTINUOUS PRN
Status: DISCONTINUED | OUTPATIENT
Start: 2019-06-07 | End: 2019-06-07 | Stop reason: SURG

## 2019-06-07 RX ORDER — PROPOFOL 10 MG/ML
VIAL (ML) INTRAVENOUS AS NEEDED
Status: DISCONTINUED | OUTPATIENT
Start: 2019-06-07 | End: 2019-06-07 | Stop reason: SURG

## 2019-06-07 RX ORDER — GLYCOPYRROLATE 0.2 MG/ML
INJECTION INTRAMUSCULAR; INTRAVENOUS AS NEEDED
Status: DISCONTINUED | OUTPATIENT
Start: 2019-06-07 | End: 2019-06-07 | Stop reason: SURG

## 2019-06-07 RX ADMIN — LIDOCAINE HYDROCHLORIDE 60 MG: 20 INJECTION, SOLUTION INFILTRATION; PERINEURAL at 12:30

## 2019-06-07 RX ADMIN — SODIUM CHLORIDE 8 MG/HR: 900 INJECTION INTRAVENOUS at 07:43

## 2019-06-07 RX ADMIN — SODIUM CHLORIDE, POTASSIUM CHLORIDE, SODIUM LACTATE AND CALCIUM CHLORIDE: 600; 310; 30; 20 INJECTION, SOLUTION INTRAVENOUS at 12:25

## 2019-06-07 RX ADMIN — GLYCOPYRROLATE 0.2 MCG: 0.2 INJECTION INTRAMUSCULAR; INTRAVENOUS at 12:40

## 2019-06-07 RX ADMIN — PROPOFOL 100 MCG/KG/MIN: 10 INJECTION, EMULSION INTRAVENOUS at 12:30

## 2019-06-07 RX ADMIN — SODIUM CHLORIDE, POTASSIUM CHLORIDE, SODIUM LACTATE AND CALCIUM CHLORIDE 1000 ML: 600; 310; 30; 20 INJECTION, SOLUTION INTRAVENOUS at 12:23

## 2019-06-07 RX ADMIN — METOPROLOL SUCCINATE 150 MG: 100 TABLET, FILM COATED, EXTENDED RELEASE ORAL at 09:42

## 2019-06-07 RX ADMIN — PROPOFOL 100 MG: 10 INJECTION, EMULSION INTRAVENOUS at 12:31

## 2019-06-07 NOTE — ANESTHESIA POSTPROCEDURE EVALUATION
"Patient: Henri Santana    Procedure Summary     Date:  06/07/19 Room / Location:   [facility] /  [facility]    Anesthesia Start:  1227 Anesthesia Stop:  1310    Procedures:       COLONOSCOPY (N/A )      ESOPHAGOGASTRODUODENOSCOPY (N/A Esophagus) Diagnosis:       Heme positive stool      Gastroesophageal reflux disease, esophagitis presence not specified      Iron deficiency anemia, unspecified iron deficiency anemia type      (Heme positive stool [R19.5])      (Gastroesophageal reflux disease, esophagitis presence not specified [K21.9])      (Iron deficiency anemia, unspecified iron deficiency anemia type [D50.9])    Surgeon:  Hoa Alan MD Provider:  Omid Mondragon MD    Anesthesia Type:  MAC ASA Status:  3          Anesthesia Type: MAC  Last vitals  BP   113/59 (06/07/19 1321)   Temp   36.4 °C (97.6 °F) (06/07/19 1311)   Pulse   66 (06/07/19 1321)   Resp   16 (06/07/19 1321)     SpO2   95 % (06/07/19 1321)     Post Anesthesia Care and Evaluation    Patient location during evaluation: PACU  Patient participation: complete - patient participated  Level of consciousness: awake  Pain score: 0  Pain management: adequate  Airway patency: patent  Anesthetic complications: No anesthetic complications  PONV Status: none  Cardiovascular status: acceptable  Respiratory status: acceptable  Hydration status: acceptable    Comments: /59   Pulse 66   Temp 36.4 °C (97.6 °F) (Oral)   Resp 16   Ht 177.8 cm (70\")   Wt 108 kg (237 lb 4.8 oz)   SpO2 95%   BMI 34.05 kg/m²       "

## 2019-06-07 NOTE — DISCHARGE SUMMARY
Patient Name: Henri Santana  : 1945  MRN: [id number]    Date of Admission: 2019  Date of Discharge:  2019  Primary Care Physician: Loki Diane MD      Chief Complaint:   Chest Pain      Discharge Diagnoses     Active Hospital Problems    Diagnosis  POA   • **UGI bleed [K92.2]  Yes   • Lipoma of intra-abdominal organs [D17.5]  Yes   • Gastric polyp [K31.7]  Yes   • Second degree hemorrhoids [K64.1]  Unknown   • Diverticulosis of large intestine without diverticulitis [K57.30]  Yes   • Hemorrhagic disorder due to extrinsic circulating anticoagulants (CMS/HCC) [D68.32]  Yes   • Acute posthemorrhagic anemia [D62]  Yes   • Symptomatic anemia [D64.9]  Yes   • Chest pain, atypical [R07.89]  Yes   • Chronic anticoagulation [Z79.01]  Not Applicable   • Chronic diastolic CHF (congestive heart failure) (CMS/HCC) [I50.32]  Yes   • Gastroesophageal reflux disease [K21.9]  Yes   • Iron deficiency anemia due to chronic blood loss [D50.0]  Yes   • Obstructive sleep apnea [G47.33]  Yes   • Atrial fibrillation (CMS/HCC) [I48.91]  Yes   • Acid reflux [K21.9]  Yes   • Benign essential HTN [I10]  Yes      Resolved Hospital Problems   No resolved problems to display.        Hospital Course     Mr. Santana is a 74 y.o. male former smoker with a history of PAF on Eliquis, HTN, TEJA (does not use CPAP), chronic diastolic CHF and prostate CA who presented to [facility] initially complaining of chest pain.  Please see the admitting history and physical for further details.  He was found to have severe anemia and melena and was admitted to the hospital for further evaluation and treatment.  He was seen by cardiology as well as gastroenterology.  CT venogram was negative for pulmonary embolus and stress test was normal.  He was cleared to undergo endoscopic evaluation.  Obviously Eliquis was discontinued on admission.  He underwent EGD and colonoscopy with results outlined below.  No obvious source of bleeding  was identified.  GI has cleared him for discharge home.  They did remove the polyp and want to hold Eliquis until Monday.  He will follow-up in their office for capsule endoscopy.    CT scan suggested some cirrhotic changes in his liver.  Liver ultrasound was ordered and is pending at time of dictation.  GI will follow up with this at his outpatient appointment.      Day of Discharge     HPI:   Feels good following his procedures.  No new complaints.  No further bleeding.    Physical Exam:  Temp:  [97.6 °F (36.4 °C)-98 °F (36.7 °C)] 97.8 °F (36.6 °C)  Heart Rate:  [59-66] 59  Resp:  [16] 16  BP: ()/(46-70) 143/60  Body mass index is 34.05 kg/m².  Physical Exam   Constitutional: He is oriented to person, place, and time. He appears well-developed. He is cooperative. No distress.   Neck: No JVD present. No tracheal deviation present.   Cardiovascular: Normal rate and regular rhythm.   Murmur heard.  Pulmonary/Chest: Effort normal and breath sounds normal. No respiratory distress.   Abdominal: Soft. Normal appearance and bowel sounds are normal. He exhibits no distension. There is no tenderness.   Musculoskeletal: He exhibits edema.   Neurological: He is alert and oriented to person, place, and time.   Skin: Skin is warm and dry.   Psychiatric: He has a normal mood and affect. His behavior is normal.   Nursing note and vitals reviewed.      Consultants     Consult Orders (all) (From admission, onward)    Start     Ordered    06/05/19 2027  Inpatient Gastroenterology Consult  Once     Specialty:  Gastroenterology  Provider:  Geraldo Azul MD    06/05/19 2027 06/05/19 2026  Inpatient Cardiology Consult  Once     Specialty:  Cardiology  Provider:  Whit Casiano MD    06/05/19 2027          Procedures     COLONOSCOPY      ESOPHAGOGASTRODUODENOSCOPY      STRESS TEST  · Left ventricular ejection fraction is normal (Calculated EF = 66%).  · Myocardial perfusion imaging indicates a normal myocardial perfusion  study with no evidence of ischemia.  · Impressions are consistent with a low risk study.  · Diaphragmatic attenuation artifact is present.    US Liver   PENDING AT TIME OF DC     Procedure Component Value Units Date/Time    CT Angiogram Chest With Contrast  Collected:  06/06/19 1307    Impression:       1. There is no evidence for pulmonary thromboemboli.  2. There are mild dependent atelectatic changes at both lung bases.  There is otherwise no new abnormality.  3. Cirrhosis with splenomegaly.  4. There is cholelithiasis without evidence for cholecystitis.           Pertinent Labs     Results from last 7 days   Lab Units 06/07/19  0810 06/07/19  0011 06/06/19  1533 06/06/19  0749 06/05/19  1810   WBC 10*3/mm3  --   --   --  7.02 6.73   HEMOGLOBIN g/dL 9.2* 9.4* 9.4* 9.1* 7.1*   PLATELETS 10*3/mm3  --   --   --  270 281     Results from last 7 days   Lab Units 06/07/19  0810 06/06/19  0749 06/05/19  1810   SODIUM mmol/L 139 144 140   POTASSIUM mmol/L 3.5 3.8 3.6   CHLORIDE mmol/L 102 105 103   CO2 mmol/L 23.0 26.4 24.1   BUN mg/dL 10 13 12   CREATININE mg/dL 0.99 1.14 1.02   GLUCOSE mg/dL 143* 138* 126*   Estimated Creatinine Clearance: 83 mL/min (by C-G formula based on SCr of 0.99 mg/dL).  Results from last 7 days   Lab Units 06/06/19  0749 06/05/19  1810   ALBUMIN g/dL 3.70 3.30*   BILIRUBIN mg/dL 1.2 0.8   ALK PHOS U/L 123* 116   AST (SGOT) U/L 35 34   ALT (SGPT) U/L 17 18     Results from last 7 days   Lab Units 06/07/19  0810 06/06/19  0749 06/05/19  1810   CALCIUM mg/dL 8.9 8.7 9.0   ALBUMIN g/dL  --  3.70 3.30*       Results from last 7 days   Lab Units 06/06/19  0749 06/05/19  1810   TROPONIN T ng/mL  --  <0.010   PROBNP pg/mL 295.2  --            Test Results Pending at Discharge      Order Current Status    Tissue Pathology Exam In process        Discharge Details        Discharge Medications      Continue These Medications      Instructions Start Date   albuterol 108 (90 Base) MCG/ACT inhaler  Commonly  known as:  PROAIR RESPICLICK   2 puffs, Inhalation, Every 4 Hours PRN      apixaban 5 MG tablet tablet  Commonly known as:  ELIQUIS   5 mg, Oral, 2 Times Daily      EQL POTASSIUM GLUCONATE 595 (99 K) MG tablet tablet  Generic drug:  potassium gluconate   595 mg, Oral, Daily      ferrous sulfate 325 (65 FE) MG tablet   325 mg, Oral, Daily      hydrochlorothiazide 25 MG tablet  Commonly known as:  HYDRODIURIL   25 mg, Oral, Daily      metoprolol succinate XL 50 MG 24 hr tablet  Commonly known as:  TOPROL-XL   150 mg, Oral, Daily      omeprazole 40 MG capsule  Commonly known as:  priLOSEC   40 mg, Oral, Daily             No Known Allergies      Discharge Disposition:  Home or Self Care    Discharge Diet:  Diet Order   Procedures   • Diet Regular; Low Sodium; 2,000 mg Na       Discharge Activity:   Activity Instructions     Activity as Tolerated            CODE STATUS:    Code Status and Medical Interventions:   Ordered at: 06/05/19 2027     Code Status:    CPR     Medical Interventions (Level of Support Prior to Arrest):    Full       Future Appointments   Date Time Provider Department Center   6/24/2019  9:10 AM Luis Eduardo Hinds MD [facility] None   8/8/2019 10:40 AM Whit Casiaon MD [facility] None     Follow-up Information     Loki Diane MD Follow up in 2 week(s).    Specialties:  Family Medicine, Emergency Medicine, Internal Medicine  Contact information:  [address] [id number] [phone]             Hoa Alan MD Follow up.    Specialty:  Gastroenterology  Contact information:  [facility] [id number] [phone]                   Time Spent on Discharge:  Greater than 30 minutes      Electronically signed by Demetri Conley MD, 6/7/2019, 3:30 PM

## 2019-06-07 NOTE — ANESTHESIA PREPROCEDURE EVALUATION
Anesthesia Evaluation     Patient summary reviewed and Nursing notes reviewed   NPO Solid Status: > 8 hours  NPO Liquid Status: > 8 hours           Airway   Mallampati: I  TM distance: >3 FB  Neck ROM: full  No difficulty expected  Dental - normal exam     Pulmonary - normal exam   (+) shortness of breath, sleep apnea,   Cardiovascular - normal exam    (+) hypertension, dysrhythmias Atrial Fib, CHF, hyperlipidemia,       Neuro/Psych- negative ROS  GI/Hepatic/Renal/Endo    (+) obesity,  GERD, GI bleeding,   (-) morbid obesity    Musculoskeletal (-) negative ROS    Abdominal  - normal exam    Bowel sounds: normal.   Substance History - negative use     OB/GYN negative ob/gyn ROS         Other      history of cancer                    Anesthesia Plan    ASA 3     MAC     Anesthetic plan, all risks, benefits, and alternatives have been provided, discussed and informed consent has been obtained with: patient.

## 2019-06-07 NOTE — TELEPHONE ENCOUNTER
----- Message from Hoa Alan MD sent at 6/7/2019  2:13 PM EDT -----  Regarding: Schedule outpatient capsule  Diagnosis-anemia, heme positive stool

## 2019-06-07 NOTE — PLAN OF CARE
Problem: Patient Care Overview  Goal: Plan of Care Review  Outcome: Ongoing (interventions implemented as appropriate)   06/07/19 0425   Coping/Psychosocial   Plan of Care Reviewed With patient   Plan of Care Review   Progress improving   OTHER   Outcome Summary Pt A&Ox4, reports no pain, no bloody stools, Prep completed, Protonix drip continues, ad ángel, room air, EGC and Cscope scheduled for am, vitals stable       Problem: Gastrointestinal Bleeding (Adult)  Goal: Signs and Symptoms of Listed Potential Problems Will be Absent, Minimized or Managed (Gastrointestinal Bleeding)  Outcome: Ongoing (interventions implemented as appropriate)      Problem: Pain, Acute (Adult)  Goal: Acceptable Pain Control/Comfort Level  Outcome: Ongoing (interventions implemented as appropriate)      Problem: Anemia (Adult)  Goal: Symptom Improvement  Outcome: Ongoing (interventions implemented as appropriate)

## 2019-06-08 ENCOUNTER — READMISSION MANAGEMENT (OUTPATIENT)
Dept: CALL CENTER | Facility: HOSPITAL | Age: 74
End: 2019-06-08

## 2019-06-08 NOTE — OUTREACH NOTE
Prep Survey      Responses   Facility patient discharged from?  [facility]   Is patient eligible?  Yes   Discharge diagnosis  UGI bleed   Does the patient have one of the following disease processes/diagnoses(primary or secondary)?  Other   Does the patient have Home health ordered?  No   Is there a DME ordered?  No   Prep survey completed?  Yes          Isabella Avery RN

## 2019-06-10 ENCOUNTER — TELEPHONE (OUTPATIENT)
Dept: GASTROENTEROLOGY | Facility: CLINIC | Age: 74
End: 2019-06-10

## 2019-06-10 ENCOUNTER — READMISSION MANAGEMENT (OUTPATIENT)
Dept: CALL CENTER | Facility: HOSPITAL | Age: 74
End: 2019-06-10

## 2019-06-10 LAB
CYTO UR: NORMAL
LAB AP CASE REPORT: NORMAL
PATH REPORT.FINAL DX SPEC: NORMAL
PATH REPORT.GROSS SPEC: NORMAL

## 2019-06-10 NOTE — TELEPHONE ENCOUNTER
Liver is mildly enlarged but cirrhosis is not appreciated on this exam which is good news.  He has gallstones but no GB inflammation.    The polyp(s) biopsies showed adenomatous change. This is not cancerous but is considered potentially precancerous. Follow-up colonoscopy in 5 years is advised.     Pt is to be scheduled for a capsule for further eval of anemia, heme + stool

## 2019-06-10 NOTE — PROGRESS NOTES
Case Management Discharge Note    Final Note: Pt dc'd home     Destination      No service has been selected for the patient.      Durable Medical Equipment      No service has been selected for the patient.      Dialysis/Infusion      No service has been selected for the patient.      Home Medical Care      No service has been selected for the patient.      Therapy      No service has been selected for the patient.      Community Resources      No service has been selected for the patient.        Transportation Services  Private: Car    Final Discharge Disposition Code: 01 - home or self-care

## 2019-06-10 NOTE — TELEPHONE ENCOUNTER
Call to pt.  Advise per Dr Alan that liver is enlarged but cirrhosis is not appreciated on this exam, which is good news.  Has gallstones, but no GB inflammation.    Polyp(s) bx showed adenomatous change.  This is not cancerous, but is considered potentially precancerous  F/u c/s in 5 yrs is advised.    To be scheduled for capsule for further eval .  Verb understanding.    Message to Guera MOULTON    C/s for 6/7/24 placed in recall.

## 2019-06-10 NOTE — OUTREACH NOTE
Medical Week 1 Survey      Responses   Facility patient discharged from?  [facility]   Does the patient have one of the following disease processes/diagnoses(primary or secondary)?  Other   Is there a successful TCM telephone encounter documented?  No   Week 1 attempt successful?  Yes   Call start time  0928   Call end time  0931   Discharge diagnosis  UGI bleed   Is patient permission given to speak with other caregiver?  Yes   List who call center can speak with  spouse- Myrna   Person spoke with today (if not patient) and relationship  spouse- Mryna   Meds reviewed with patient/caregiver?  Yes   Is the patient having any side effects they believe may be caused by any medication additions or changes?  No   Does the patient have all medications ordered at discharge?  N/A   Is the patient taking all medications as directed (includes completed medication regime)?  Yes   Does the patient have a primary care provider?   Yes   Does the patient have an appointment with their PCP within 7 days of discharge?  Yes   Has the patient kept scheduled appointments due by today?  N/A   Has home health visited the patient within 72 hours of discharge?  N/A   Psychosocial issues?  No   Did the patient receive a copy of their discharge instructions?  Yes   Nursing interventions  Reviewed instructions with patient   What is the patient's perception of their health status since discharge?  Improving   Is the patient/caregiver able to teach back signs and symptoms related to disease process for when to call PCP?  Yes   Is the patient/caregiver able to teach back signs and symptoms related to disease process for when to call 911?  Yes   Is the patient/caregiver able to teach back the hierarchy of who to call/visit for symptoms/problems? PCP, Specialist, Home health nurse, Urgent Care, ED, 911  Yes   Additional teach back comments  Per wife, pt is doing much better, no questions or concerns noted.   Week 1 call completed?  Yes           Lizet Washington, RN

## 2019-06-13 ENCOUNTER — TELEPHONE (OUTPATIENT)
Dept: FAMILY MEDICINE CLINIC | Facility: CLINIC | Age: 74
End: 2019-06-13

## 2019-06-13 NOTE — TELEPHONE ENCOUNTER
----- Message from Brianne Foote sent at 6/11/2019  3:01 PM EDT -----  Regarding: [facility] HOS F/U SCHEDULED   Pt is scheduled for a hospital f/u with  on 6/18. Pt admitted to [facility] on 6/5/19 for UGI Bleed and discharged 6/7/19.
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
## 2019-06-17 RX ORDER — APIXABAN 5 MG/1
TABLET, FILM COATED ORAL
Qty: 180 TABLET | Refills: 3 | Status: SHIPPED | OUTPATIENT
Start: 2019-06-17 | End: 2020-06-11

## 2019-06-18 ENCOUNTER — READMISSION MANAGEMENT (OUTPATIENT)
Dept: CALL CENTER | Facility: HOSPITAL | Age: 74
End: 2019-06-18

## 2019-06-18 ENCOUNTER — OFFICE VISIT (OUTPATIENT)
Dept: CARDIOLOGY | Facility: CLINIC | Age: 74
End: 2019-06-18

## 2019-06-18 VITALS
BODY MASS INDEX: 34.65 KG/M2 | OXYGEN SATURATION: 98 % | DIASTOLIC BLOOD PRESSURE: 50 MMHG | HEART RATE: 58 BPM | WEIGHT: 242 LBS | HEIGHT: 70 IN | SYSTOLIC BLOOD PRESSURE: 112 MMHG

## 2019-06-18 DIAGNOSIS — G47.33 OBSTRUCTIVE SLEEP APNEA: ICD-10-CM

## 2019-06-18 DIAGNOSIS — I10 BENIGN ESSENTIAL HTN: ICD-10-CM

## 2019-06-18 DIAGNOSIS — Z79.01 CHRONIC ANTICOAGULATION: Chronic | ICD-10-CM

## 2019-06-18 DIAGNOSIS — R07.89 CHEST PAIN, ATYPICAL: ICD-10-CM

## 2019-06-18 DIAGNOSIS — E78.01 FAMILIAL HYPERCHOLESTEROLEMIA: ICD-10-CM

## 2019-06-18 DIAGNOSIS — I50.32 CHRONIC DIASTOLIC CHF (CONGESTIVE HEART FAILURE) (HCC): Chronic | ICD-10-CM

## 2019-06-18 DIAGNOSIS — I48.0 PAROXYSMAL ATRIAL FIBRILLATION (HCC): Primary | ICD-10-CM

## 2019-06-18 DIAGNOSIS — D50.9 IRON DEFICIENCY ANEMIA, UNSPECIFIED IRON DEFICIENCY ANEMIA TYPE: ICD-10-CM

## 2019-06-18 PROCEDURE — 93000 ELECTROCARDIOGRAM COMPLETE: CPT | Performed by: NURSE PRACTITIONER

## 2019-06-18 PROCEDURE — 99214 OFFICE O/P EST MOD 30 MIN: CPT | Performed by: NURSE PRACTITIONER

## 2019-06-18 RX ORDER — FUROSEMIDE 40 MG/1
40 TABLET ORAL 2 TIMES DAILY
COMMUNITY
End: 2019-06-29 | Stop reason: SDUPTHER

## 2019-06-18 NOTE — OUTREACH NOTE
Medical Week 2 Survey      Responses   Facility patient discharged from?  [place]   Does the patient have one of the following disease processes/diagnoses(primary or secondary)?  Other   Week 2 attempt successful?  Yes   Call start time  1153   Discharge diagnosis  UGI bleed   Call end time  1155   Meds reviewed with patient/caregiver?  Yes   Is the patient having any side effects they believe may be caused by any medication additions or changes?  No   Does the patient have all medications ordered at discharge?  N/A   Is the patient taking all medications as directed (includes completed medication regime)?  Yes   Comments regarding appointments  Cardiology appt today. 06/18/2019   Does the patient have a primary care provider?   Yes   Does the patient have an appointment with their PCP within 7 days of discharge?  Yes   Has the patient kept scheduled appointments due by today?  Yes   Has home health visited the patient within 72 hours of discharge?  N/A   Psychosocial issues?  No   Did the patient receive a copy of their discharge instructions?  Yes   Nursing interventions  Reviewed instructions with patient   What is the patient's perception of their health status since discharge?  Improving   Is the patient/caregiver able to teach back signs and symptoms related to disease process for when to call PCP?  Yes   Is the patient/caregiver able to teach back signs and symptoms related to disease process for when to call 911?  Yes   Is the patient/caregiver able to teach back the hierarchy of who to call/visit for symptoms/problems? PCP, Specialist, Home health nurse, Urgent Care, ED, 911  Yes   Additional teach back comments  Per wife, pt is doing much better, no questions or concerns noted.   Week 2 Call Completed?  Yes          Linwood Lamar RN

## 2019-06-20 ENCOUNTER — OFFICE VISIT (OUTPATIENT)
Dept: FAMILY MEDICINE CLINIC | Facility: CLINIC | Age: 74
End: 2019-06-20

## 2019-06-20 VITALS
RESPIRATION RATE: 14 BRPM | OXYGEN SATURATION: 100 % | SYSTOLIC BLOOD PRESSURE: 130 MMHG | DIASTOLIC BLOOD PRESSURE: 82 MMHG | HEART RATE: 61 BPM | WEIGHT: 242 LBS | TEMPERATURE: 98.3 F | BODY MASS INDEX: 34.65 KG/M2 | HEIGHT: 70 IN

## 2019-06-20 DIAGNOSIS — Z79.01 CHRONIC ANTICOAGULATION: Chronic | ICD-10-CM

## 2019-06-20 DIAGNOSIS — D62 ACUTE POSTHEMORRHAGIC ANEMIA: ICD-10-CM

## 2019-06-20 DIAGNOSIS — Z86.2 HISTORY OF ANEMIA: ICD-10-CM

## 2019-06-20 DIAGNOSIS — D64.9 SYMPTOMATIC ANEMIA: ICD-10-CM

## 2019-06-20 DIAGNOSIS — D50.0 IRON DEFICIENCY ANEMIA DUE TO CHRONIC BLOOD LOSS: ICD-10-CM

## 2019-06-20 DIAGNOSIS — D68.32 HEMORRHAGIC DISORDER DUE TO EXTRINSIC CIRCULATING ANTICOAGULANTS (HCC): ICD-10-CM

## 2019-06-20 DIAGNOSIS — D50.9 IRON DEFICIENCY ANEMIA, UNSPECIFIED IRON DEFICIENCY ANEMIA TYPE: ICD-10-CM

## 2019-06-20 DIAGNOSIS — K92.2 UGI BLEED: Primary | ICD-10-CM

## 2019-06-20 PROCEDURE — 99495 TRANSJ CARE MGMT MOD F2F 14D: CPT | Performed by: FAMILY MEDICINE

## 2019-06-20 NOTE — PROGRESS NOTES
Transitional Care Follow Up Visit  Subjective     Henri Santana is a 74 y.o. male who presents for a transitional care management visit.    Within 48 business hours after discharge our office contacted him via telephone to coordinate his care and needs.      I reviewed and discussed the details of that call along with the discharge summary, hospital problems, inpatient lab results, inpatient diagnostic studies, and consultation reports with Henri.     Current outpatient and discharge medications have been reconciled for the patient.    No flowsheet data found.  Risk for Readmission (LACE) Score: 9 (6/7/2019  6:00 AM)      History of Present Illness   Course During Hospital Stay:     Mr. Santana is a 74 y.o. male former smoker with a history of PAF on Eliquis, HTN, TEJA (does not use CPAP), chronic diastolic CHF and prostate CA who presented to [facility] initially complaining of chest pain.  Please see the admitting history and physical for further details.  He was found to have severe anemia and melena and was admitted to the hospital for further evaluation and treatment.  He was seen by cardiology as well as gastroenterology.  CT venogram was negative for pulmonary embolus and stress test was normal.  He was cleared to undergo endoscopic evaluation.  Obviously Eliquis was discontinued on admission.  He underwent EGD and colonoscopy with results outlined below.  No obvious source of bleeding was identified.  GI has cleared him for discharge home.  They did remove the polyp and want to hold Eliquis until Monday.  He will follow-up in their office for capsule endoscopy.     CT scan suggested some cirrhotic changes in his liver.  Liver ultrasound was ordered and is pending at time of dictation.  GI will follow up with this at his outpatient appointment.     Pt doing well now. No complaints. Has follow up with Cardiology and Gastro.    The following portions of the patient's history were reviewed and updated as  appropriate: allergies, current medications, past family history, past medical history, past social history, past surgical history and problem list.    Review of Systems   Constitutional: Positive for fatigue.   Neurological: Positive for dizziness and weakness.   All other systems reviewed and are negative.      Objective   Physical Exam   Constitutional: He is oriented to person, place, and time. He appears well-developed and well-nourished. No distress.   HENT:   Head: Normocephalic and atraumatic.   Right Ear: External ear normal.   Left Ear: External ear normal.   Nose: Nose normal.   Mouth/Throat: Oropharynx is clear and moist.   Eyes: Conjunctivae and EOM are normal. Pupils are equal, round, and reactive to light. Right eye exhibits no discharge. Left eye exhibits no discharge. No scleral icterus.   Cardiovascular: Normal rate, regular rhythm and normal heart sounds.   Pulmonary/Chest: Effort normal and breath sounds normal. No respiratory distress. He has no wheezes. He has no rales.   Abdominal: Soft. Bowel sounds are normal. He exhibits no distension. There is no tenderness.   Neurological: He is alert and oriented to person, place, and time.   Skin: Skin is warm and dry. He is not diaphoretic.   Nursing note and vitals reviewed.      Assessment/Plan   Henri was seen today for anemia.    Diagnoses and all orders for this visit:    UGI bleed  -     CBC Auto Differential    Chronic anticoagulation  -     CBC Auto Differential    History of anemia  -     CBC Auto Differential    Iron deficiency anemia, unspecified iron deficiency anemia type  -     CBC Auto Differential    Symptomatic anemia  -     CBC Auto Differential    Iron deficiency anemia due to chronic blood loss  -     CBC Auto Differential    Hemorrhagic disorder due to extrinsic circulating anticoagulants (CMS/HCC)  -     CBC Auto Differential    Acute posthemorrhagic anemia  -     CBC Auto Differential      Labs, imaging, and records reviewed.  Continue with current plan.

## 2019-06-21 LAB
BASOPHILS # BLD AUTO: 0.08 10*3/MM3 (ref 0–0.2)
BASOPHILS NFR BLD AUTO: 1.4 % (ref 0–1.5)
EOSINOPHIL # BLD AUTO: 0.09 10*3/MM3 (ref 0–0.4)
EOSINOPHIL NFR BLD AUTO: 1.6 % (ref 0.3–6.2)
ERYTHROCYTE [DISTWIDTH] IN BLOOD BY AUTOMATED COUNT: 17.3 % (ref 12.3–15.4)
HCT VFR BLD AUTO: 31.7 % (ref 37.5–51)
HGB BLD-MCNC: 8.8 G/DL (ref 13–17.7)
IMM GRANULOCYTES # BLD AUTO: 0.02 10*3/MM3 (ref 0–0.05)
IMM GRANULOCYTES NFR BLD AUTO: 0.4 % (ref 0–0.5)
LYMPHOCYTES # BLD AUTO: 0.98 10*3/MM3 (ref 0.7–3.1)
LYMPHOCYTES NFR BLD AUTO: 17.5 % (ref 19.6–45.3)
MCH RBC QN AUTO: 24.4 PG (ref 26.6–33)
MCHC RBC AUTO-ENTMCNC: 27.8 G/DL (ref 31.5–35.7)
MCV RBC AUTO: 87.8 FL (ref 79–97)
MONOCYTES # BLD AUTO: 0.47 10*3/MM3 (ref 0.1–0.9)
MONOCYTES NFR BLD AUTO: 8.4 % (ref 5–12)
NEUTROPHILS # BLD AUTO: 3.95 10*3/MM3 (ref 1.7–7)
NEUTROPHILS NFR BLD AUTO: 70.7 % (ref 42.7–76)
NRBC BLD AUTO-RTO: 0 /100 WBC (ref 0–0.2)
PLATELET # BLD AUTO: 171 10*3/MM3 (ref 140–450)
RBC # BLD AUTO: 3.61 10*6/MM3 (ref 4.14–5.8)
WBC # BLD AUTO: 5.59 10*3/MM3 (ref 3.4–10.8)

## 2019-06-24 ENCOUNTER — OFFICE VISIT (OUTPATIENT)
Dept: SURGERY | Facility: CLINIC | Age: 74
End: 2019-06-24

## 2019-06-24 VITALS — WEIGHT: 242 LBS | BODY MASS INDEX: 34.65 KG/M2 | OXYGEN SATURATION: 94 % | HEART RATE: 75 BPM | HEIGHT: 70 IN

## 2019-06-24 DIAGNOSIS — K43.2 INCISIONAL HERNIA, WITHOUT OBSTRUCTION OR GANGRENE: Primary | ICD-10-CM

## 2019-06-24 PROCEDURE — 99202 OFFICE O/P NEW SF 15 MIN: CPT | Performed by: SURGERY

## 2019-06-24 NOTE — PROGRESS NOTES
SUMMARY (A/P):    74-year-old gentleman referred for ventral hernia.  On CT scan and physical examination he does not have any evidence of a ventral hernia.  He does have a prominent rectus diastases and truncal obesity.    Cholelithiasis which appears asymptomatic.  No further work-up or treatment warranted at this time.    Possible cirrhosis on recent CT scan.  Ultrasound revealed fatty liver and cholelithiasis.  I discussed the findings with him and recommended that he further discuss with gastroenterology.    All was discussed with him.  He will return as needed.      CC:    Abdominal wall protrusion    HPI:    74-year-old gentleman presents with 2-year history of significant anterior abdominal wall protrusion.  He has no pain associated with this.  No other symptoms of note.    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 Scott  Reoperation for anastomotic leak 12/26/2014-Dr. Scott    PMH:    CHF  Hypertension  Hyperlipidemia  Atrial fibrillation  Sleep apnea  Gastroesophageal reflux disease  Obesity    FAMILY HISTORY:    Negative for colorectal cancer    SOCIAL HISTORY:   Denies tobacco use  Occasional alcohol use    ALLERGIES: reviewed, in Epic    MEDICATIONS: reviewed, in Epic    ROS:  No chest pain or shortness of air.  All other systems reviewed and negative other than presenting complaints.    RADIOLOGY/ENDOSCOPY:    -Ultrasound liver 6/7/2019 mild hepatomegaly and cholelithiasis  -CT angiogram chest 6/6/2019: Cirrhosis with splenomegaly and cholelithiasis.  I reviewed the images and concur.    PHYSICAL EXAM:   Constitutional: Well-developed well-nourished, no acute distress  Vital signs: HR 75, weight 242 pounds, height 70 inches, BMI 34.7  Discussed with patient increased perioperative risks associated with obesity including increased risks of DVT, infection, seromas, poor wound healing and hernias (with abdominal  Spoke with Rommel for [facility] and he said that it is her right ankle that is causing her pain during PT and patient has requested to have an xray done of the right ankle. Is this something you would be okay to order?   surgery).  Eyes: Conjunctiva normal, sclera nonicteric  ENMT: Hearing grossly normal, oral mucosa moist  Neck: Supple, no palpable mass, trachea midline  Respiratory: Clear to auscultation, normal inspiratory effort  Cardiovascular: Regular rate, no murmur, no carotid bruit  Gastrointestinal: Soft, nontender, no palpable mass, no hepatosplenomegaly, significant rectus diastases but no visible or palpable evidence of incisional hernia, well-healed long midline incision  Skin:  Warm, dry, no rash on visualized skin surfaces  Musculoskeletal: Symmetric strength, normal gait  Psychiatric: Alert and oriented ×3, normal affect     DANDRE KEMP M.D.

## 2019-06-26 ENCOUNTER — READMISSION MANAGEMENT (OUTPATIENT)
Dept: CALL CENTER | Facility: HOSPITAL | Age: 74
End: 2019-06-26

## 2019-06-26 DIAGNOSIS — Z86.2 HISTORY OF ANEMIA: Primary | ICD-10-CM

## 2019-06-26 NOTE — OUTREACH NOTE
Medical Week 3 Survey      Responses   Facility patient discharged from?  [place]   Does the patient have one of the following disease processes/diagnoses(primary or secondary)?  Other   Week 3 attempt successful?  No   Unsuccessful attempts  Attempt 1          Niyah Stanton RN

## 2019-06-27 ENCOUNTER — READMISSION MANAGEMENT (OUTPATIENT)
Dept: CALL CENTER | Facility: HOSPITAL | Age: 74
End: 2019-06-27

## 2019-06-27 NOTE — OUTREACH NOTE
Medical Week 3 Survey      Responses   Facility patient discharged from?  [facility]   Does the patient have one of the following disease processes/diagnoses(primary or secondary)?  Other   Week 3 attempt successful?  Yes   Call start time  1654   Call end time  1657   Discharge diagnosis  UGI bleed   Meds reviewed with patient/caregiver?  Yes   Is the patient having any side effects they believe may be caused by any medication additions or changes?  No   Does the patient have all medications ordered at discharge?  Yes   Is the patient taking all medications as directed (includes completed medication regime)?  Yes   Does the patient have a primary care provider?   Yes   Does the patient have an appointment with their PCP within 7 days of discharge?  Yes   Has the patient kept scheduled appointments due by today?  Yes   Has home health visited the patient within 72 hours of discharge?  N/A   Psychosocial issues?  No   Did the patient receive a copy of their discharge instructions?  Yes   Nursing interventions  Reviewed instructions with patient   What is the patient's perception of their health status since discharge?  Improving   Is the patient/caregiver able to teach back signs and symptoms related to disease process for when to call PCP?  Yes   Is the patient/caregiver able to teach back signs and symptoms related to disease process for when to call 911?  Yes   Is the patient/caregiver able to teach back the hierarchy of who to call/visit for symptoms/problems? PCP, Specialist, Home health nurse, Urgent Care, ED, 911  Yes   Week 3 Call Completed?  Yes          Deanna Nunez RN

## 2019-06-28 LAB
BASOPHILS # BLD AUTO: 0 X10E3/UL (ref 0–0.2)
BASOPHILS NFR BLD AUTO: 1 %
EOSINOPHIL # BLD AUTO: 0.1 X10E3/UL (ref 0–0.4)
EOSINOPHIL NFR BLD AUTO: 2 %
ERYTHROCYTE [DISTWIDTH] IN BLOOD BY AUTOMATED COUNT: 17.6 % (ref 12.3–15.4)
HCT VFR BLD AUTO: 28.9 % (ref 37.5–51)
HGB BLD-MCNC: 8.7 G/DL (ref 13–17.7)
IMM GRANULOCYTES # BLD AUTO: 0 X10E3/UL (ref 0–0.1)
IMM GRANULOCYTES NFR BLD AUTO: 0 %
IRON SATN MFR SERPL: 6 % (ref 15–55)
IRON SERPL-MCNC: 24 UG/DL (ref 38–169)
LYMPHOCYTES # BLD AUTO: 0.8 X10E3/UL (ref 0.7–3.1)
LYMPHOCYTES NFR BLD AUTO: 18 %
Lab: NORMAL
MCH RBC QN AUTO: 25.2 PG (ref 26.6–33)
MCHC RBC AUTO-ENTMCNC: 30.1 G/DL (ref 31.5–35.7)
MCV RBC AUTO: 84 FL (ref 79–97)
MONOCYTES # BLD AUTO: 0.3 X10E3/UL (ref 0.1–0.9)
MONOCYTES NFR BLD AUTO: 6 %
NEUTROPHILS # BLD AUTO: 3.6 X10E3/UL (ref 1.4–7)
NEUTROPHILS NFR BLD AUTO: 73 %
PLATELET # BLD AUTO: 187 X10E3/UL (ref 150–450)
RBC # BLD AUTO: 3.45 X10E6/UL (ref 4.14–5.8)
TIBC SERPL-MCNC: 371 UG/DL (ref 250–450)
UIBC SERPL-MCNC: 347 UG/DL (ref 111–343)
WBC # BLD AUTO: 4.8 X10E3/UL (ref 3.4–10.8)

## 2019-07-01 ENCOUNTER — RESULTS ENCOUNTER (OUTPATIENT)
Dept: FAMILY MEDICINE CLINIC | Facility: CLINIC | Age: 74
End: 2019-07-01

## 2019-07-01 DIAGNOSIS — Z86.2 HISTORY OF ANEMIA: ICD-10-CM

## 2019-07-01 RX ORDER — FUROSEMIDE 40 MG/1
TABLET ORAL
Qty: 90 TABLET | Refills: 2 | Status: SHIPPED | OUTPATIENT
Start: 2019-07-01 | End: 2020-03-27

## 2019-07-11 ENCOUNTER — OFFICE VISIT (OUTPATIENT)
Dept: GASTROENTEROLOGY | Facility: CLINIC | Age: 74
End: 2019-07-11

## 2019-07-11 VITALS
HEIGHT: 70 IN | TEMPERATURE: 98.8 F | BODY MASS INDEX: 33.76 KG/M2 | WEIGHT: 235.8 LBS | SYSTOLIC BLOOD PRESSURE: 132 MMHG | DIASTOLIC BLOOD PRESSURE: 60 MMHG

## 2019-07-11 DIAGNOSIS — K92.2 UGI BLEED: Primary | ICD-10-CM

## 2019-07-11 DIAGNOSIS — K31.7 GASTRIC POLYP: ICD-10-CM

## 2019-07-11 DIAGNOSIS — K21.9 GASTROESOPHAGEAL REFLUX DISEASE WITHOUT ESOPHAGITIS: ICD-10-CM

## 2019-07-11 DIAGNOSIS — D50.0 IRON DEFICIENCY ANEMIA DUE TO CHRONIC BLOOD LOSS: ICD-10-CM

## 2019-07-11 PROCEDURE — 99214 OFFICE O/P EST MOD 30 MIN: CPT | Performed by: NURSE PRACTITIONER

## 2019-07-11 RX ORDER — ACETAMINOPHEN 325 MG/1
650 TABLET ORAL EVERY 6 HOURS PRN
COMMUNITY

## 2019-07-11 RX ORDER — PANTOPRAZOLE SODIUM 40 MG/1
40 TABLET, DELAYED RELEASE ORAL DAILY
Qty: 90 TABLET | Refills: 3 | Status: SHIPPED | OUTPATIENT
Start: 2019-07-11 | End: 2020-06-18

## 2019-07-11 NOTE — PROGRESS NOTES
Chief Complaint   Patient presents with   • Anemia       Henri Santana is a  74 y.o. male here for a hospital follow up visit for anemia.     HPI  75 yo m presents today for hospital follow up visit for GI bleed and anemia. He is a patient of Dr. Alan. He was seen by our service at [facility] on 6/5/19-6/7/19. He was seen for Iron deficiency anemia secondary to acute blood loss, HEME POSITIVE stool and melena. Hgb on admission was 7.1. He underwent EGD/Colonoscopy on 6/7/19 that showed Gastric polyps and a gastric lipoma. Path was negative. Colonoscopy showed TA colon polyps. NO GI bleeding source was found. He still receive a blood transfusion. He does take Eliquis for A-fib. Hgb did stabilize and on discharge it was 8.7.     Since being home he denies any signs of acute GI bleeding. He is still having some dark formed stools but admits he is taking Iron daily. He denies any dysphagia, reflux, abd pain, N&V, diarrhea, constipation, rectal bleeding or melena. He admits his appetite is good and his weight has dropped from 242 lbs 6/20/19 to 235 lbs today. He does have hx GERD and takes omeprazole but he doesn't feel like its working well at all. He would like to change it.     Past Medical History:   Diagnosis Date   • Abdominal bloating    • Abnormal weight loss    • Anemia     blood loss   • Atrial fibrillation (CMS/HCC)    • Atrial flutter (CMS/HCC)     persistent   • Benign essential hypertension    • Chest pain    • Colon polyps 11/28/2014, 12/23/2014, 04/14/2016, 06/07/2019   • Cough    • GERD (gastroesophageal reflux disease)    • Hyperglycemia    • Hyperglyceridemia    • Hyperlipidemia    • Hypertension    • Malignant neoplasm of prostate (CMS/HCC)    • Nephrolithiasis    • Obesity    • PAF (paroxysmal atrial fibrillation) (CMS/HCC)    • Prostate cancer (CMS/HCC)    • Sleep apnea    • SOB (shortness of breath)        Past Surgical History:   Procedure Laterality Date   • CARDIAC  DEFIBRILLATOR PLACEMENT     • COLECTOMY PARTIAL / TOTAL Right 12/23/2014    Dr. Fabrice Scott, [facility]   • COLON RESECTION N/A 12/26/2014    Resection of leaking anastomosis-Dr. Fabrice Scott, [facility]   • COLONOSCOPY N/A 4/14/2016    Procedure: COLONOSCOPY TO ILEOCOLIC ANASTOMOSIS WITH POLYPECTOMY;  Surgeon: Geraldo Azul MD;  Location: [facility] ENDOSCOPY;  Service:    • COLONOSCOPY N/A 6/7/2019    IH, diverticulosis, one 5 mm polyp, anastomosis, characterized by erythema, Path: tubular adenoma   • COLONOSCOPY N/A 03/15/2005    Normal-Dr. Shailesh Sloan, [facility]   • COLONOSCOPY N/A 11/28/2014    Tumor in the cecum (fragments of tubulovillous adenoma with focal high grade dysplasia), one 3 mm hyperplastic polyp in the sigmoid colon, diverticulosis in the sigmoid colon, non-bleeding internal hemorrhoids-Dr. Hoa Alan, [facility]   • ENDOSCOPY  4/14/2016    Procedure: ESOPHAGOGASTRODUODENOSCOPY WITH BIOPSIES;  Surgeon: Geraldo Azul MD;  Location: [facility] ENDOSCOPY;  Service:    • ENDOSCOPY N/A 6/7/2019    Gastric lipoma, nodular mucosa in gastric body, no specimens collected   • ENDOSCOPY N/A 05/16/2003    Some evidence of Salazar's esophagitis-Dr. Fabrice Scott, [facility]   • MOUTH SURGERY  1992   • PROSTATE BIOPSY Bilateral    • PROSTATE SURGERY     • UPPER GASTROINTESTINAL ENDOSCOPY N/A 11/28/2014    Normal esophagus, single medium-sized papule with no bleeding and no stigmata of recent bleeding in the stomach, duodenitis-Dr. Hoa Alan, [facility]       Scheduled Meds:    Continuous Infusions:  No current facility-administered medications for this visit.     PRN Meds:.    No Known Allergies    Social History     Socioeconomic History   • Marital status:      Spouse name: Not on file   • Number of children: Not on file   • Years of education: Not on file   • Highest education level: Not on file   Tobacco Use   • Smoking status: Former Smoker     Packs/day: 1.00     Years: 30.00     Pack years: 30.00     Types: Cigarettes      Last attempt to quit: 1967     Years since quittin.5   • Smokeless tobacco: Former User   • Tobacco comment: caffeine use   Substance and Sexual Activity   • Alcohol use: No     Frequency: Never   • Drug use: No   • Sexual activity: Defer       Family History   Problem Relation Age of Onset   • Cancer Other    • Hypertension Other    • Stroke Other    • Heart attack Other    • Heart disease Mother    • Hypertension Mother    • Diabetes Mother    • Cancer Mother    • Heart disease Father    • Stroke Father    • Hypertension Father    • Cancer Father        Review of Systems   Constitutional: Negative for appetite change, chills, diaphoresis, fatigue, fever and unexpected weight change.   HENT: Negative for nosebleeds, postnasal drip, sore throat, trouble swallowing and voice change.    Respiratory: Negative for cough, choking, chest tightness, shortness of breath and wheezing.    Cardiovascular: Negative for chest pain, palpitations and leg swelling.   Gastrointestinal: Negative for abdominal distention, abdominal pain, anal bleeding, blood in stool, constipation, diarrhea, nausea, rectal pain and vomiting.   Endocrine: Negative for polydipsia, polyphagia and polyuria.   Musculoskeletal: Negative for gait problem.   Skin: Negative for rash and wound.   Allergic/Immunologic: Negative for food allergies.   Neurological: Negative for dizziness, speech difficulty and light-headedness.   Psychiatric/Behavioral: Negative for confusion, self-injury, sleep disturbance and suicidal ideas.       Vitals:    19 1425   BP: 132/60   Temp: 98.8 °F (37.1 °C)       Physical Exam   Constitutional: He is oriented to person, place, and time. He appears well-developed and well-nourished. He does not appear ill. No distress.   HENT:   Head: Normocephalic.   Eyes: Pupils are equal, round, and reactive to light.   Cardiovascular: Normal rate, regular rhythm and normal heart sounds.   Pulmonary/Chest: Effort normal and breath  sounds normal.   Abdominal: Soft. Bowel sounds are normal. He exhibits no distension and no mass. There is no hepatosplenomegaly. There is no tenderness. There is no rebound and no guarding. No hernia.   Musculoskeletal: Normal range of motion.   Neurological: He is alert and oriented to person, place, and time.   Skin: Skin is warm and dry.   Psychiatric: He has a normal mood and affect. His speech is normal and behavior is normal. Judgment normal.       No images are attached to the encounter.    Assessment & Plan    1. UGI bleed    2. Gastric polyp    3. Gastroesophageal reflux disease without esophagitis    4. Iron deficiency anemia due to chronic blood loss      Reviewed hospital records with him today. Hgb was still low but stable at 8.7 on 6/27/19. Will recheck labs today. Since EGD and colonoscopy didn't yield a GI bleeding source Patient is scheduled for video capsule study 8/5/19. He is still having dark stools but he admits he does take daily iron. He denies any rectal bleeding. GERD is not well controlled. Will change omeprazole to Protonix 40 mg daily and see how he does. Continue GERD precautions. Call office with any issues.

## 2019-07-12 ENCOUNTER — TELEPHONE (OUTPATIENT)
Dept: GASTROENTEROLOGY | Facility: CLINIC | Age: 74
End: 2019-07-12

## 2019-07-12 LAB
ALBUMIN SERPL-MCNC: 3.8 G/DL (ref 3.5–5.2)
ALBUMIN/GLOB SERPL: 1.3 G/DL
ALP SERPL-CCNC: 125 U/L (ref 39–117)
ALT SERPL-CCNC: 22 U/L (ref 1–41)
AST SERPL-CCNC: 38 U/L (ref 1–40)
BASOPHILS # BLD AUTO: 0.06 10*3/MM3 (ref 0–0.2)
BASOPHILS NFR BLD AUTO: 1 % (ref 0–1.5)
BILIRUB SERPL-MCNC: 1 MG/DL (ref 0.2–1.2)
BUN SERPL-MCNC: 14 MG/DL (ref 8–23)
BUN/CREAT SERPL: 12.8 (ref 7–25)
CALCIUM SERPL-MCNC: 9.1 MG/DL (ref 8.6–10.5)
CHLORIDE SERPL-SCNC: 106 MMOL/L (ref 98–107)
CO2 SERPL-SCNC: 30.1 MMOL/L (ref 22–29)
CREAT SERPL-MCNC: 1.09 MG/DL (ref 0.76–1.27)
EOSINOPHIL # BLD AUTO: 0.1 10*3/MM3 (ref 0–0.4)
EOSINOPHIL NFR BLD AUTO: 1.6 % (ref 0.3–6.2)
ERYTHROCYTE [DISTWIDTH] IN BLOOD BY AUTOMATED COUNT: 16.3 % (ref 12.3–15.4)
GLOBULIN SER CALC-MCNC: 3 GM/DL
GLUCOSE SERPL-MCNC: 177 MG/DL (ref 65–99)
HCT VFR BLD AUTO: 31.9 % (ref 37.5–51)
HGB BLD-MCNC: 8.8 G/DL (ref 13–17.7)
IMM GRANULOCYTES # BLD AUTO: 0.03 10*3/MM3 (ref 0–0.05)
IMM GRANULOCYTES NFR BLD AUTO: 0.5 % (ref 0–0.5)
LYMPHOCYTES # BLD AUTO: 1.05 10*3/MM3 (ref 0.7–3.1)
LYMPHOCYTES NFR BLD AUTO: 16.7 % (ref 19.6–45.3)
MCH RBC QN AUTO: 24.4 PG (ref 26.6–33)
MCHC RBC AUTO-ENTMCNC: 27.6 G/DL (ref 31.5–35.7)
MCV RBC AUTO: 88.6 FL (ref 79–97)
MONOCYTES # BLD AUTO: 0.44 10*3/MM3 (ref 0.1–0.9)
MONOCYTES NFR BLD AUTO: 7 % (ref 5–12)
NEUTROPHILS # BLD AUTO: 4.62 10*3/MM3 (ref 1.7–7)
NEUTROPHILS NFR BLD AUTO: 73.2 % (ref 42.7–76)
NRBC BLD AUTO-RTO: 0 /100 WBC (ref 0–0.2)
PLATELET # BLD AUTO: 247 10*3/MM3 (ref 140–450)
POTASSIUM SERPL-SCNC: 4.2 MMOL/L (ref 3.5–5.2)
PROT SERPL-MCNC: 6.8 G/DL (ref 6–8.5)
RBC # BLD AUTO: 3.6 10*6/MM3 (ref 4.14–5.8)
SODIUM SERPL-SCNC: 145 MMOL/L (ref 136–145)
WBC # BLD AUTO: 6.3 10*3/MM3 (ref 3.4–10.8)

## 2019-07-12 NOTE — TELEPHONE ENCOUNTER
----- Message from SWEETIE Brownlee sent at 7/12/2019  9:12 AM EDT -----  Please call patient and let him know the Hgb is still low at 8.8 but that stable for him. Proceed with capsule as planned. All other labs were stable. Thanks.

## 2019-07-15 NOTE — TELEPHONE ENCOUNTER
Called pt and advised per Antonia NP that the hgb is still low at 8.8 but that is stable for him. Proceeds with capsule as planned .  All other abs were stable.  Pt verb understanding.

## 2019-07-18 ENCOUNTER — TELEPHONE (OUTPATIENT)
Dept: CARDIOLOGY | Facility: CLINIC | Age: 74
End: 2019-07-18

## 2019-07-25 ENCOUNTER — OFFICE VISIT (OUTPATIENT)
Dept: CARDIOLOGY | Facility: CLINIC | Age: 74
End: 2019-07-25

## 2019-07-25 VITALS
BODY MASS INDEX: 33.76 KG/M2 | SYSTOLIC BLOOD PRESSURE: 134 MMHG | HEART RATE: 65 BPM | RESPIRATION RATE: 18 BRPM | DIASTOLIC BLOOD PRESSURE: 76 MMHG | WEIGHT: 235.8 LBS | HEIGHT: 70 IN

## 2019-07-25 DIAGNOSIS — D50.9 IRON DEFICIENCY ANEMIA, UNSPECIFIED IRON DEFICIENCY ANEMIA TYPE: ICD-10-CM

## 2019-07-25 DIAGNOSIS — E78.01 FAMILIAL HYPERCHOLESTEROLEMIA: ICD-10-CM

## 2019-07-25 DIAGNOSIS — I48.0 PAROXYSMAL ATRIAL FIBRILLATION (HCC): Primary | ICD-10-CM

## 2019-07-25 DIAGNOSIS — Z79.01 CHRONIC ANTICOAGULATION: ICD-10-CM

## 2019-07-25 DIAGNOSIS — I50.32 CHRONIC DIASTOLIC CHF (CONGESTIVE HEART FAILURE) (HCC): ICD-10-CM

## 2019-07-25 DIAGNOSIS — I10 BENIGN ESSENTIAL HTN: ICD-10-CM

## 2019-07-25 DIAGNOSIS — G47.33 OBSTRUCTIVE SLEEP APNEA: ICD-10-CM

## 2019-07-25 PROCEDURE — 99214 OFFICE O/P EST MOD 30 MIN: CPT | Performed by: NURSE PRACTITIONER

## 2019-07-25 PROCEDURE — 93000 ELECTROCARDIOGRAM COMPLETE: CPT | Performed by: NURSE PRACTITIONER
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
## 2019-08-05 ENCOUNTER — TELEPHONE (OUTPATIENT)
Dept: GASTROENTEROLOGY | Facility: CLINIC | Age: 74
End: 2019-08-05

## 2019-08-05 PROCEDURE — 91110 GI TRC IMG INTRAL ESOPH-ILE: CPT | Performed by: INTERNAL MEDICINE

## 2019-08-06 ENCOUNTER — TELEPHONE (OUTPATIENT)
Dept: GASTROENTEROLOGY | Facility: CLINIC | Age: 74
End: 2019-08-06

## 2019-08-06 DIAGNOSIS — D50.9 IRON DEFICIENCY ANEMIA, UNSPECIFIED IRON DEFICIENCY ANEMIA TYPE: Primary | ICD-10-CM

## 2019-08-06 NOTE — TELEPHONE ENCOUNTER
Please let the patient know that no active bleeding was seen on his capsule study however multiple superficial blood vessels were seen in the early small intestine, probably just beyond where we reach during his EGD.  These can bleed especially if you are on blood thinner.  I would like to have him repeat a CBC this week at his convenience.  I like to see him back in the office in about a month and we can discuss whether we want to proceed with any intervention to treat these blood vessels to prevent him from bleeding.  I can see him on September 5 at 1230

## 2019-08-07 NOTE — TELEPHONE ENCOUNTER
Call to pt . Advise per Dr Alan that no active bleeding was seen on capsule study, however mult superficial blood vessels were seen in the early small intestine, probably just beyond where reached during EGD.  These can bleed, especially if on blood thinner.      Dr Alan would like to see back in office to discuss whether want to proceed with any intervention to tx these blood vessels to prevent bleeding.    Verb understanding.  Accepts appt 9/5 @ 12:30 - message to Manager.    Lab appt scheduled for 8/8 @ 3pm.  Order placed for cbc - message to Dr Alan.

## 2019-08-09 ENCOUNTER — TELEPHONE (OUTPATIENT)
Dept: GASTROENTEROLOGY | Facility: CLINIC | Age: 74
End: 2019-08-09

## 2019-08-09 DIAGNOSIS — D50.9 IRON DEFICIENCY ANEMIA, UNSPECIFIED IRON DEFICIENCY ANEMIA TYPE: Primary | ICD-10-CM

## 2019-08-09 DIAGNOSIS — D50.0 IRON DEFICIENCY ANEMIA DUE TO CHRONIC BLOOD LOSS: ICD-10-CM

## 2019-08-09 LAB
BASOPHILS # BLD AUTO: 0.06 10*3/MM3 (ref 0–0.2)
BASOPHILS NFR BLD AUTO: 1.3 % (ref 0–1.5)
EOSINOPHIL # BLD AUTO: 0.07 10*3/MM3 (ref 0–0.4)
EOSINOPHIL NFR BLD AUTO: 1.5 % (ref 0.3–6.2)
ERYTHROCYTE [DISTWIDTH] IN BLOOD BY AUTOMATED COUNT: 14.6 % (ref 12.3–15.4)
HCT VFR BLD AUTO: 28 % (ref 37.5–51)
HGB BLD-MCNC: 7.5 G/DL (ref 13–17.7)
IMM GRANULOCYTES # BLD AUTO: 0.02 10*3/MM3 (ref 0–0.05)
IMM GRANULOCYTES NFR BLD AUTO: 0.4 % (ref 0–0.5)
LYMPHOCYTES # BLD AUTO: 0.83 10*3/MM3 (ref 0.7–3.1)
LYMPHOCYTES NFR BLD AUTO: 17.5 % (ref 19.6–45.3)
MCH RBC QN AUTO: 23.2 PG (ref 26.6–33)
MCHC RBC AUTO-ENTMCNC: 26.8 G/DL (ref 31.5–35.7)
MCV RBC AUTO: 86.7 FL (ref 79–97)
MONOCYTES # BLD AUTO: 0.33 10*3/MM3 (ref 0.1–0.9)
MONOCYTES NFR BLD AUTO: 6.9 % (ref 5–12)
NEUTROPHILS # BLD AUTO: 3.44 10*3/MM3 (ref 1.7–7)
NEUTROPHILS NFR BLD AUTO: 72.4 % (ref 42.7–76)
NRBC BLD AUTO-RTO: 0 /100 WBC (ref 0–0.2)
PLATELET # BLD AUTO: 228 10*3/MM3 (ref 140–450)
RBC # BLD AUTO: 3.23 10*6/MM3 (ref 4.14–5.8)
WBC # BLD AUTO: 4.75 10*3/MM3 (ref 3.4–10.8)

## 2019-08-09 NOTE — TELEPHONE ENCOUNTER
Hemoglobin is lower than last checked.  Tell him to increase his iron to twice daily and I want to repeat a CBC in 1 week.  If he gets much lower he may need outpatient transfusion

## 2019-08-14 LAB
BASOPHILS # BLD AUTO: 0.06 10*3/MM3 (ref 0–0.2)
BASOPHILS NFR BLD AUTO: 1 % (ref 0–1.5)
EOSINOPHIL # BLD AUTO: 0.1 10*3/MM3 (ref 0–0.4)
EOSINOPHIL NFR BLD AUTO: 1.6 % (ref 0.3–6.2)
ERYTHROCYTE [DISTWIDTH] IN BLOOD BY AUTOMATED COUNT: 15.1 % (ref 12.3–15.4)
HCT VFR BLD AUTO: 28.9 % (ref 37.5–51)
HGB BLD-MCNC: 7.6 G/DL (ref 13–17.7)
IMM GRANULOCYTES # BLD AUTO: 0.05 10*3/MM3 (ref 0–0.05)
IMM GRANULOCYTES NFR BLD AUTO: 0.8 % (ref 0–0.5)
LYMPHOCYTES # BLD AUTO: 0.87 10*3/MM3 (ref 0.7–3.1)
LYMPHOCYTES NFR BLD AUTO: 14.1 % (ref 19.6–45.3)
MCH RBC QN AUTO: 23.5 PG (ref 26.6–33)
MCHC RBC AUTO-ENTMCNC: 26.3 G/DL (ref 31.5–35.7)
MCV RBC AUTO: 89.2 FL (ref 79–97)
MONOCYTES # BLD AUTO: 0.41 10*3/MM3 (ref 0.1–0.9)
MONOCYTES NFR BLD AUTO: 6.6 % (ref 5–12)
NEUTROPHILS # BLD AUTO: 4.7 10*3/MM3 (ref 1.7–7)
NEUTROPHILS NFR BLD AUTO: 75.9 % (ref 42.7–76)
NRBC BLD AUTO-RTO: 0 /100 WBC (ref 0–0.2)
PLATELET # BLD AUTO: 201 10*3/MM3 (ref 140–450)
RBC # BLD AUTO: 3.24 10*6/MM3 (ref 4.14–5.8)
WBC # BLD AUTO: 6.19 10*3/MM3 (ref 3.4–10.8)

## 2019-08-14 NOTE — TELEPHONE ENCOUNTER
Call to pt . Advise per Dr Aaln that hgb lower than last checked.  Increase iron to twice daily and repeat CBC.  If gets much lower, may need outpt transfusion.    Verb understanding.   Concerned that had not heard re: labs until today. Apology to pt for delay.  Lab appt schedule for today per pt request at 11 am.  Order placed for cbc - message to Dr Alan.    Advise pt may contact office for results.  Verb understanding.

## 2019-08-15 NOTE — TELEPHONE ENCOUNTER
Called pt and advised per Dr Alan that his hgb is stable and to continue iron bid.  Also advised we will need to continue to follow levels closely until we see improvement .  Repeat cbc in one week. Pt verb understanding and made appt for 08/21 at 10 for lab.

## 2019-08-15 NOTE — TELEPHONE ENCOUNTER
Hb stable - cont bid iron - will need to continue to follow levels closely until we see improvement - repeat cbc 1 week

## 2019-08-21 LAB
BASOPHILS # BLD AUTO: 0.05 10*3/MM3 (ref 0–0.2)
BASOPHILS NFR BLD AUTO: 0.9 % (ref 0–1.5)
EOSINOPHIL # BLD AUTO: 0.08 10*3/MM3 (ref 0–0.4)
EOSINOPHIL NFR BLD AUTO: 1.5 % (ref 0.3–6.2)
ERYTHROCYTE [DISTWIDTH] IN BLOOD BY AUTOMATED COUNT: 16.7 % (ref 12.3–15.4)
HCT VFR BLD AUTO: 28.7 % (ref 37.5–51)
HGB BLD-MCNC: 7.7 G/DL (ref 13–17.7)
IMM GRANULOCYTES # BLD AUTO: 0.05 10*3/MM3 (ref 0–0.05)
IMM GRANULOCYTES NFR BLD AUTO: 0.9 % (ref 0–0.5)
LYMPHOCYTES # BLD AUTO: 0.88 10*3/MM3 (ref 0.7–3.1)
LYMPHOCYTES NFR BLD AUTO: 16.4 % (ref 19.6–45.3)
MCH RBC QN AUTO: 24.3 PG (ref 26.6–33)
MCHC RBC AUTO-ENTMCNC: 26.8 G/DL (ref 31.5–35.7)
MCV RBC AUTO: 90.5 FL (ref 79–97)
MONOCYTES # BLD AUTO: 0.28 10*3/MM3 (ref 0.1–0.9)
MONOCYTES NFR BLD AUTO: 5.2 % (ref 5–12)
NEUTROPHILS # BLD AUTO: 4.01 10*3/MM3 (ref 1.7–7)
NEUTROPHILS NFR BLD AUTO: 75.1 % (ref 42.7–76)
NRBC BLD AUTO-RTO: 0 /100 WBC (ref 0–0.2)
PLATELET # BLD AUTO: 203 10*3/MM3 (ref 140–450)
RBC # BLD AUTO: 3.17 10*6/MM3 (ref 4.14–5.8)
WBC # BLD AUTO: 5.35 10*3/MM3 (ref 3.4–10.8)

## 2019-08-22 ENCOUNTER — TELEPHONE (OUTPATIENT)
Dept: GASTROENTEROLOGY | Facility: CLINIC | Age: 74
End: 2019-08-22

## 2019-08-22 NOTE — TELEPHONE ENCOUNTER
Hemoglobin with slight improvement.  Overall stable.  We will recheck at his follow-up September 5

## 2019-08-22 NOTE — TELEPHONE ENCOUNTER
Called pt and advised per Dr Alan that his hgb with slight improvement .  Overall stable.  We will recheck at his f/u 09/05.  Pt verb understanding.

## 2019-09-05 ENCOUNTER — OFFICE VISIT (OUTPATIENT)
Dept: GASTROENTEROLOGY | Facility: CLINIC | Age: 74
End: 2019-09-05

## 2019-09-05 VITALS
BODY MASS INDEX: 33.95 KG/M2 | TEMPERATURE: 98.1 F | DIASTOLIC BLOOD PRESSURE: 60 MMHG | SYSTOLIC BLOOD PRESSURE: 128 MMHG | WEIGHT: 236.6 LBS

## 2019-09-05 DIAGNOSIS — D62 ANEMIA DUE TO ACUTE BLOOD LOSS: Primary | ICD-10-CM

## 2019-09-05 DIAGNOSIS — K31.819 AVM (ARTERIOVENOUS MALFORMATION) OF DUODENUM, ACQUIRED: ICD-10-CM

## 2019-09-05 PROCEDURE — 99213 OFFICE O/P EST LOW 20 MIN: CPT | Performed by: INTERNAL MEDICINE

## 2019-09-05 NOTE — PROGRESS NOTES
Chief Complaint   Patient presents with   • Follow-up   • UGI bleed   • Black or Bloody Stool       Henri Santana is a  74 y.o. male here for a follow up visit for melena, anemia.    He reports he has had black stool x 1 week.  He takes eliquis for a fib.  No soa, cp, dizziness.  Denies abdominal pain, n/v.    Reviewed recent capsule study which showed proximal small bowel avms.       HPI  Past Medical History:   Diagnosis Date   • Abdominal bloating    • Abnormal weight loss    • Anemia     blood loss   • Atrial fibrillation (CMS/HCC)    • Atrial flutter (CMS/HCC)     persistent   • Benign essential hypertension    • Chest pain    • Colon polyps 11/28/2014, 12/23/2014, 04/14/2016, 06/07/2019   • Cough    • GERD (gastroesophageal reflux disease)    • Hyperglycemia    • Hyperglyceridemia    • Hyperlipidemia    • Hypertension    • Malignant neoplasm of prostate (CMS/HCC)    • Nephrolithiasis    • Obesity    • PAF (paroxysmal atrial fibrillation) (CMS/HCC)    • Prostate cancer (CMS/HCC)    • Sleep apnea    • SOB (shortness of breath)      Past Surgical History:   Procedure Laterality Date   • CARDIAC DEFIBRILLATOR PLACEMENT     • COLECTOMY PARTIAL / TOTAL Right 12/23/2014    Dr. Fabrcie Scott, [facility]   • COLON RESECTION N/A 12/26/2014    Resection of leaking anastomosis-Dr. Fabrice Scott, [facility]   • COLONOSCOPY N/A 4/14/2016    Procedure: COLONOSCOPY TO ILEOCOLIC ANASTOMOSIS WITH POLYPECTOMY;  Surgeon: Geraldo Azul MD;  Location: [facility] ENDOSCOPY;  Service:    • COLONOSCOPY N/A 6/7/2019    IH, diverticulosis, one 5 mm polyp, anastomosis, characterized by erythema, Path: tubular adenoma   • COLONOSCOPY N/A 03/15/2005    Normal-Dr. Shailesh Sloan, [facility]   • COLONOSCOPY N/A 11/28/2014    Tumor in the cecum (fragments of tubulovillous adenoma with focal high grade dysplasia), one 3 mm hyperplastic polyp in the sigmoid colon, diverticulosis in the sigmoid colon, non-bleeding internal hemorrhoids-Dr. oHa Alan, [facility]   •  ENDOSCOPY  4/14/2016    Procedure: ESOPHAGOGASTRODUODENOSCOPY WITH BIOPSIES;  Surgeon: Geraldo Azul MD;  Location: [facility] ENDOSCOPY;  Service:    • ENDOSCOPY N/A 6/7/2019    Gastric lipoma, nodular mucosa in gastric body, no specimens collected   • ENDOSCOPY N/A 05/16/2003    Some evidence of Salazar's esophagitis-Dr. Fabrice Scott, [facility]   • MOUTH SURGERY  1992   • PROSTATE BIOPSY Bilateral    • PROSTATE SURGERY     • UPPER GASTROINTESTINAL ENDOSCOPY N/A 11/28/2014    Normal esophagus, single medium-sized papule with no bleeding and no stigmata of recent bleeding in the stomach, duodenitis-Dr. Hoa Alan, [facility]       Current Outpatient Medications:   •  acetaminophen (TYLENOL) 325 MG tablet, Take 650 mg by mouth Every 6 (Six) Hours As Needed for Mild Pain ., Disp: , Rfl:   •  albuterol (PROAIR RESPICLICK) 108 (90 Base) MCG/ACT inhaler, Inhale 2 puffs Every 4 (Four) Hours As Needed for Wheezing or Shortness of Air., Disp: 1 inhaler, Rfl: 11  •  ELIQUIS 5 MG tablet tablet, TAKE 1 TABLET EVERY 12 HOURS, Disp: 180 tablet, Rfl: 3  •  ferrous sulfate 325 (65 FE) MG tablet, Take 325 mg by mouth Daily., Disp: , Rfl:   •  furosemide (LASIX) 40 MG tablet, TAKE 1 TABLET DAILY, Disp: 90 tablet, Rfl: 2  •  metoprolol succinate XL (TOPROL-XL) 50 MG 24 hr tablet, Take 150 mg by mouth Daily., Disp: , Rfl:   •  pantoprazole (PROTONIX) 40 MG EC tablet, Take 1 tablet by mouth Daily., Disp: 90 tablet, Rfl: 3  •  potassium gluconate (EQL POTASSIUM GLUCONATE) 595 MG tablet tablet, Take 595 mg by mouth daily., Disp: , Rfl:   PRN Meds:.  No Known Allergies  Social History     Socioeconomic History   • Marital status:      Spouse name: Not on file   • Number of children: Not on file   • Years of education: Not on file   • Highest education level: Not on file   Tobacco Use   • Smoking status: Former Smoker     Packs/day: 1.00     Years: 30.00     Pack years: 30.00     Types: Cigarettes     Last attempt to quit: 1967     Years  since quittin.7   • Smokeless tobacco: Former User   • Tobacco comment: caffeine use   Substance and Sexual Activity   • Alcohol use: No     Frequency: Never   • Drug use: No   • Sexual activity: Defer     Family History   Problem Relation Age of Onset   • Cancer Other    • Hypertension Other    • Stroke Other    • Heart attack Other    • Heart disease Mother    • Hypertension Mother    • Diabetes Mother    • Cancer Mother    • Heart disease Father    • Stroke Father    • Hypertension Father    • Cancer Father      Review of Systems   Constitutional: Negative for appetite change and unexpected weight change.   Respiratory: Negative for shortness of breath.    Cardiovascular: Negative for chest pain.   Gastrointestinal: Positive for blood in stool. Negative for abdominal pain.   Neurological: Negative for dizziness.   All other systems reviewed and are negative.    Vitals:    19 1231   BP: 128/60   Temp: 98.1 °F (36.7 °C)         19  1231   Weight: 107 kg (236 lb 9.6 oz)     Physical Exam   Constitutional: He appears well-developed and well-nourished.   HENT:   Head: Normocephalic and atraumatic.   Eyes: No scleral icterus.   Pulmonary/Chest: Effort normal.   Abdominal: Soft. He exhibits no distension.   Neurological: He is alert.   Skin: Skin is warm and dry.   Psychiatric: He has a normal mood and affect.     No images are attached to the encounter.  Diagnoses and all orders for this visit:    Anemia due to acute blood loss  -     CBC & Differential    AVM (arteriovenous malformation) of duodenum, acquired    Plan:  - discussed capsule results - offered egd/enteroscopy with apc of visualized lesions - he would like to think about this  - recheck cbc today - had improved a little  - continue iron supp

## 2019-09-06 ENCOUNTER — TELEPHONE (OUTPATIENT)
Dept: GASTROENTEROLOGY | Facility: CLINIC | Age: 74
End: 2019-09-06

## 2019-09-06 DIAGNOSIS — D50.9 IRON DEFICIENCY ANEMIA, UNSPECIFIED IRON DEFICIENCY ANEMIA TYPE: Primary | ICD-10-CM

## 2019-09-06 LAB
BASOPHILS # BLD AUTO: 0.06 10*3/MM3 (ref 0–0.2)
BASOPHILS NFR BLD AUTO: 1.1 % (ref 0–1.5)
EOSINOPHIL # BLD AUTO: 0.06 10*3/MM3 (ref 0–0.4)
EOSINOPHIL NFR BLD AUTO: 1.1 % (ref 0.3–6.2)
ERYTHROCYTE [DISTWIDTH] IN BLOOD BY AUTOMATED COUNT: 16.9 % (ref 12.3–15.4)
HCT VFR BLD AUTO: 33.5 % (ref 37.5–51)
HGB BLD-MCNC: 9.1 G/DL (ref 13–17.7)
IMM GRANULOCYTES # BLD AUTO: 0.02 10*3/MM3 (ref 0–0.05)
IMM GRANULOCYTES NFR BLD AUTO: 0.4 % (ref 0–0.5)
LYMPHOCYTES # BLD AUTO: 0.73 10*3/MM3 (ref 0.7–3.1)
LYMPHOCYTES NFR BLD AUTO: 12.9 % (ref 19.6–45.3)
MCH RBC QN AUTO: 24.9 PG (ref 26.6–33)
MCHC RBC AUTO-ENTMCNC: 27.2 G/DL (ref 31.5–35.7)
MCV RBC AUTO: 91.8 FL (ref 79–97)
MONOCYTES # BLD AUTO: 0.43 10*3/MM3 (ref 0.1–0.9)
MONOCYTES NFR BLD AUTO: 7.6 % (ref 5–12)
NEUTROPHILS # BLD AUTO: 4.37 10*3/MM3 (ref 1.7–7)
NEUTROPHILS NFR BLD AUTO: 76.9 % (ref 42.7–76)
NRBC BLD AUTO-RTO: 0 /100 WBC (ref 0–0.2)
PLATELET # BLD AUTO: 193 10*3/MM3 (ref 140–450)
RBC # BLD AUTO: 3.65 10*6/MM3 (ref 4.14–5.8)
WBC # BLD AUTO: 5.67 10*3/MM3 (ref 3.4–10.8)

## 2019-09-06 NOTE — TELEPHONE ENCOUNTER
Called pt and advised per Dr Alan that his hgb is much improved.  Recheck it in 4 wk and continue iron.  She is also asking if he would like to pursue further endoscopic treatment.     Pt verb understanding and made lab appt for 10/09 at 1p and he wants to think about endoscopic treatment.  Update sent to Dr Alan.

## 2019-09-06 NOTE — TELEPHONE ENCOUNTER
Hb much improved - recheck 4 weeks - ask him if her wishes to pursue further endoscopic treatment as we discussed at recent appt-cont iron

## 2019-10-10 ENCOUNTER — TELEPHONE (OUTPATIENT)
Dept: GASTROENTEROLOGY | Facility: CLINIC | Age: 74
End: 2019-10-10

## 2019-10-10 DIAGNOSIS — D50.9 IRON DEFICIENCY ANEMIA, UNSPECIFIED IRON DEFICIENCY ANEMIA TYPE: Primary | ICD-10-CM

## 2019-10-10 LAB
BASOPHILS # BLD AUTO: 0 X10E3/UL (ref 0–0.2)
BASOPHILS NFR BLD AUTO: 1 %
EOSINOPHIL # BLD AUTO: 0.1 X10E3/UL (ref 0–0.4)
EOSINOPHIL NFR BLD AUTO: 1 %
ERYTHROCYTE [DISTWIDTH] IN BLOOD BY AUTOMATED COUNT: 16.2 % (ref 12.3–15.4)
HCT VFR BLD AUTO: 32.1 % (ref 37.5–51)
HGB BLD-MCNC: 10.1 G/DL (ref 13–17.7)
IMM GRANULOCYTES # BLD AUTO: 0 X10E3/UL (ref 0–0.1)
IMM GRANULOCYTES NFR BLD AUTO: 0 %
LYMPHOCYTES # BLD AUTO: 0.7 X10E3/UL (ref 0.7–3.1)
LYMPHOCYTES NFR BLD AUTO: 15 %
MCH RBC QN AUTO: 27 PG (ref 26.6–33)
MCHC RBC AUTO-ENTMCNC: 31.5 G/DL (ref 31.5–35.7)
MCV RBC AUTO: 86 FL (ref 79–97)
MONOCYTES # BLD AUTO: 0.2 X10E3/UL (ref 0.1–0.9)
MONOCYTES NFR BLD AUTO: 5 %
NEUTROPHILS # BLD AUTO: 3.8 X10E3/UL (ref 1.4–7)
NEUTROPHILS NFR BLD AUTO: 78 %
PLATELET # BLD AUTO: 199 X10E3/UL (ref 150–450)
RBC # BLD AUTO: 3.74 X10E6/UL (ref 4.14–5.8)
WBC # BLD AUTO: 4.9 X10E3/UL (ref 3.4–10.8)

## 2019-10-10 NOTE — TELEPHONE ENCOUNTER
Called pt and advised per Dr Alan that her hgb shows improvement .   Now 10.1 .  Continue iron and repeat hgb in 6 wks.  Pt verb understanding. And made lab appt for 11/18 at 1p

## 2019-10-10 NOTE — TELEPHONE ENCOUNTER
----- Message from Hedy Trent sent at 10/10/2019  2:03 PM EDT -----  Regarding: call back/ lab results  Contact: [phone]  Pt is calling for his lab results

## 2019-11-04 ENCOUNTER — RESULTS ENCOUNTER (OUTPATIENT)
Dept: GASTROENTEROLOGY | Facility: CLINIC | Age: 74
End: 2019-11-04

## 2019-11-04 DIAGNOSIS — D50.9 IRON DEFICIENCY ANEMIA, UNSPECIFIED IRON DEFICIENCY ANEMIA TYPE: ICD-10-CM

## 2019-11-19 ENCOUNTER — TELEPHONE (OUTPATIENT)
Dept: GASTROENTEROLOGY | Facility: CLINIC | Age: 74
End: 2019-11-19

## 2019-11-19 LAB
BASOPHILS # BLD AUTO: 0.08 10*3/MM3 (ref 0–0.2)
BASOPHILS NFR BLD AUTO: 1.3 % (ref 0–1.5)
EOSINOPHIL # BLD AUTO: 0.07 10*3/MM3 (ref 0–0.4)
EOSINOPHIL NFR BLD AUTO: 1.1 % (ref 0.3–6.2)
ERYTHROCYTE [DISTWIDTH] IN BLOOD BY AUTOMATED COUNT: 14.3 % (ref 12.3–15.4)
HCT VFR BLD AUTO: 36.9 % (ref 37.5–51)
HGB BLD-MCNC: 11.5 G/DL (ref 13–17.7)
IMM GRANULOCYTES # BLD AUTO: 0.01 10*3/MM3 (ref 0–0.05)
IMM GRANULOCYTES NFR BLD AUTO: 0.2 % (ref 0–0.5)
LYMPHOCYTES # BLD AUTO: 1.05 10*3/MM3 (ref 0.7–3.1)
LYMPHOCYTES NFR BLD AUTO: 17 % (ref 19.6–45.3)
MCH RBC QN AUTO: 28.1 PG (ref 26.6–33)
MCHC RBC AUTO-ENTMCNC: 31.2 G/DL (ref 31.5–35.7)
MCV RBC AUTO: 90.2 FL (ref 79–97)
MONOCYTES # BLD AUTO: 0.48 10*3/MM3 (ref 0.1–0.9)
MONOCYTES NFR BLD AUTO: 7.8 % (ref 5–12)
NEUTROPHILS # BLD AUTO: 4.47 10*3/MM3 (ref 1.7–7)
NEUTROPHILS NFR BLD AUTO: 72.6 % (ref 42.7–76)
NRBC BLD AUTO-RTO: 0 /100 WBC (ref 0–0.2)
PLATELET # BLD AUTO: 215 10*3/MM3 (ref 140–450)
RBC # BLD AUTO: 4.09 10*6/MM3 (ref 4.14–5.8)
WBC # BLD AUTO: 6.16 10*3/MM3 (ref 3.4–10.8)

## 2019-11-19 NOTE — TELEPHONE ENCOUNTER
----- Message from Hedy Trent sent at 11/19/2019  3:49 PM EST -----  Regarding: Results  Contact: [phone]  Pt is calling regarding lab results

## 2019-11-20 NOTE — TELEPHONE ENCOUNTER
Returned pt's call and in vm advised pt per Dr Alan that his labs continue to improve.  Hgb now 11.5.  Advised to continue iron and repeat cbc in 6 wks. ADvised pt to call with questions and to call back to make lab appt.

## 2020-01-06 NOTE — PROGRESS NOTES
Discharge Planning Assessment   Abel     Patient Name: Portillo Pete  MRN: [id number]  Today's Date: 1/6/2020    Admit Date: 1/4/2020    Discharge Needs Assessment     Row Name 01/06/20 1351       Living Environment    Lives With  significant other    Primary Care Provided by  [facility]    Quality of Family Relationships  supportive    Able to Return to Prior Arrangements  yes       Discharge Needs Assessment    Readmission Within the Last 30 Days  no previous admission in last 30 days    Concerns to be Addressed  medication    Concerns Comments  going home on lovenox     Discharge Coordination/Progress  plan readmit later this week for a brain bx /crani         Discharge Plan     Row Name 01/06/20 1352       Plan    Plan  D/C Plan : Home with family .     Plan Comments  Pt  needs a brain bx but has been on Eliquis . Eliquis is on hold and pt will go home on Lovenox and will readmit later this week for surgery .                 Expected Discharge Date and Time     Expected Discharge Date Expected Discharge Time    Jan 6, 2020                    Heidi Schmidt RN     Subjective   Henri Santana is a 72 y.o. male.     Chief Complaint   Patient presents with   • Follow-up     Patient has  months follow up. Patient is not having as much as nasal bleeding now.        HPI     Patient since the office today to follow-up on 3 stable chronic medical problems.  Has a history of anemia.  Last hemoglobin A1c done roughly 3-4 months ago was 11.5.  Patient states that since that time he's tried to improve his nutrition.  He states that he denies any lightheadedness or dizziness.  No blood in the stool.  Patient also history of elevated blood glucose levels.  Not currently taking any medications but trying to manage things with diet and exercise.  Blood work at his last office visit also revealed an elevated serum creatinine level.  He's been trying to avoid NSAIDs as well as other nephrotoxic medications.    The following portions of the patient's history were reviewed and updated as appropriate: allergies, current medications, past family history, past medical history, past social history, past surgical history and problem list.    Review of Systems   Constitutional: Negative for activity change.   All other systems reviewed and are negative.      Objective  Vitals:    01/09/18 1334   BP: 128/68   Pulse: 75   Resp: 14   Temp: 98.3 °F (36.8 °C)   SpO2: 97%        Physical Exam   Constitutional: He is oriented to person, place, and time. He appears well-developed and well-nourished. No distress.   HENT:   Head: Normocephalic and atraumatic.   Right Ear: External ear normal.   Left Ear: External ear normal.   Nose: Nose normal.   Mouth/Throat: Oropharynx is clear and moist.   Eyes: Conjunctivae and EOM are normal. Pupils are equal, round, and reactive to light. Right eye exhibits no discharge. Left eye exhibits no discharge. No scleral icterus.   Cardiovascular: Normal rate, regular rhythm and normal heart sounds.    Pulmonary/Chest: Effort normal and breath sounds normal. No respiratory  distress. He has no wheezes. He has no rales.   Abdominal: Soft. Bowel sounds are normal. He exhibits no distension. There is no tenderness.   Neurological: He is alert and oriented to person, place, and time.   Skin: Skin is warm and dry. He is not diaphoretic.   Nursing note and vitals reviewed.        Current Outpatient Prescriptions:   •  apixaban (ELIQUIS) 5 MG tablet tablet, Take 1 tablet by mouth Every 12 (Twelve) Hours., Disp: 60 tablet, Rfl: 0  •  furosemide (LASIX) 40 MG tablet, TAKE 1 TABLET DAILY, Disp: 90 tablet, Rfl: 2  •  metoprolol succinate XL (TOPROL-XL) 50 MG 24 hr tablet, Take 3 tablets by mouth Daily., Disp: 270 tablet, Rfl: 3  •  omeprazole (priLOSEC) 40 MG capsule, Take 1 capsule by mouth Daily., Disp: 90 capsule, Rfl: 3  •  potassium gluconate (EQL POTASSIUM GLUCONATE) 595 MG tablet tablet, Take 595 mg by mouth daily., Disp: , Rfl:   •  simethicone (MYLICON) 80 MG chewable tablet, Chew 80 mg every 6 (six) hours as needed for flatulence., Disp: , Rfl:   •  albuterol (PROVENTIL HFA;VENTOLIN HFA) 108 (90 BASE) MCG/ACT inhaler, Inhale 2 puffs Every 4 (Four) Hours As Needed for wheezing., Disp: 1 inhaler, Rfl: 0  •  carbamide peroxide (DEBROX) 6.5 % otic solution, Administer 5 drops to the right ear 2 (Two) Times a Day., Disp: 22 mL, Rfl: 0  •  ferrous sulfate 325 (65 FE) MG tablet, TAKE 1 TABLET DAILY WITH BREAKFAST, Disp: 30 tablet, Rfl: 3    Procedures    Lab Results (most recent)     None          Assessment/Plan   Henri was seen today for follow-up.    Diagnoses and all orders for this visit:    Anemia, unspecified type  -     CBC Auto Differential    Blood glucose elevated  -     Comprehensive Metabolic Panel    Elevated serum creatinine  -     Comprehensive Metabolic Panel    Labs as above to evaluate current status of chronic medical problems.  We'll adjust treatment plan based on those results.    Return in about 6 months (around 7/9/2018) for Recheck.      Loki Diane MD

## 2020-01-07 ENCOUNTER — TELEPHONE (OUTPATIENT)
Dept: GASTROENTEROLOGY | Facility: CLINIC | Age: 75
End: 2020-01-07

## 2020-01-07 DIAGNOSIS — D50.9 IRON DEFICIENCY ANEMIA, UNSPECIFIED IRON DEFICIENCY ANEMIA TYPE: Primary | ICD-10-CM

## 2020-01-09 LAB
BASOPHILS # BLD AUTO: 0.07 10*3/MM3 (ref 0–0.2)
BASOPHILS NFR BLD AUTO: 1.2 % (ref 0–1.5)
EOSINOPHIL # BLD AUTO: 0.09 10*3/MM3 (ref 0–0.4)
EOSINOPHIL NFR BLD AUTO: 1.5 % (ref 0.3–6.2)
ERYTHROCYTE [DISTWIDTH] IN BLOOD BY AUTOMATED COUNT: 13.5 % (ref 12.3–15.4)
HCT VFR BLD AUTO: 32.9 % (ref 37.5–51)
HGB BLD-MCNC: 10.9 G/DL (ref 13–17.7)
IMM GRANULOCYTES # BLD AUTO: 0.02 10*3/MM3 (ref 0–0.05)
IMM GRANULOCYTES NFR BLD AUTO: 0.3 % (ref 0–0.5)
LYMPHOCYTES # BLD AUTO: 0.99 10*3/MM3 (ref 0.7–3.1)
LYMPHOCYTES NFR BLD AUTO: 16.7 % (ref 19.6–45.3)
MCH RBC QN AUTO: 30.3 PG (ref 26.6–33)
MCHC RBC AUTO-ENTMCNC: 33.1 G/DL (ref 31.5–35.7)
MCV RBC AUTO: 91.4 FL (ref 79–97)
MONOCYTES # BLD AUTO: 0.44 10*3/MM3 (ref 0.1–0.9)
MONOCYTES NFR BLD AUTO: 7.4 % (ref 5–12)
NEUTROPHILS # BLD AUTO: 4.33 10*3/MM3 (ref 1.7–7)
NEUTROPHILS NFR BLD AUTO: 72.9 % (ref 42.7–76)
NRBC BLD AUTO-RTO: 0 /100 WBC (ref 0–0.2)
PLATELET # BLD AUTO: 198 10*3/MM3 (ref 140–450)
RBC # BLD AUTO: 3.6 10*6/MM3 (ref 4.14–5.8)
WBC # BLD AUTO: 5.94 10*3/MM3 (ref 3.4–10.8)

## 2020-01-10 ENCOUNTER — TELEPHONE (OUTPATIENT)
Dept: GASTROENTEROLOGY | Facility: CLINIC | Age: 75
End: 2020-01-10

## 2020-01-10 DIAGNOSIS — D50.9 IRON DEFICIENCY ANEMIA, UNSPECIFIED IRON DEFICIENCY ANEMIA TYPE: Primary | ICD-10-CM

## 2020-01-15 NOTE — TELEPHONE ENCOUNTER
Call to pt.  Advise per Dr Alan that anemia continues - continue iron supplement.    Verb understanding.  Lab appt scheduled for 3/25 @ 1pm.  Order placed for cbc - message to Dr Alan.

## 2020-03-18 ENCOUNTER — RESULTS ENCOUNTER (OUTPATIENT)
Dept: GASTROENTEROLOGY | Facility: CLINIC | Age: 75
End: 2020-03-18

## 2020-03-18 DIAGNOSIS — D50.9 IRON DEFICIENCY ANEMIA, UNSPECIFIED IRON DEFICIENCY ANEMIA TYPE: ICD-10-CM

## 2020-03-27 RX ORDER — FUROSEMIDE 40 MG/1
TABLET ORAL
Qty: 90 TABLET | Refills: 3 | Status: SHIPPED | OUTPATIENT
Start: 2020-03-27

## 2020-04-20 ENCOUNTER — TELEMEDICINE (OUTPATIENT)
Dept: CARDIOLOGY | Facility: CLINIC | Age: 75
End: 2020-04-20

## 2020-04-20 VITALS — OXYGEN SATURATION: 97 % | BODY MASS INDEX: 32.78 KG/M2 | WEIGHT: 229 LBS | HEART RATE: 66 BPM | HEIGHT: 70 IN

## 2020-04-20 DIAGNOSIS — G47.33 OBSTRUCTIVE SLEEP APNEA: ICD-10-CM

## 2020-04-20 DIAGNOSIS — I48.0 PAROXYSMAL ATRIAL FIBRILLATION (HCC): Primary | ICD-10-CM

## 2020-04-20 DIAGNOSIS — E78.2 MIXED HYPERLIPIDEMIA: ICD-10-CM

## 2020-04-20 DIAGNOSIS — I10 ESSENTIAL HYPERTENSION: ICD-10-CM

## 2020-04-20 PROCEDURE — 99441 PR PHYS/QHP TELEPHONE EVALUATION 5-10 MIN: CPT | Performed by: INTERNAL MEDICINE

## 2020-04-20 NOTE — PROGRESS NOTES
PATIENTINFORMATION    Date of Office Visit: 20  Encounter Provider: Whit Casiano MD  Place of Service: [facility] CARDIOLOGY  Patient Name: Henri Santana  : 1945    Subjective:         Patient ID: Henri Santana is a 75 y.o. male.      History of Present Illness    I first saw him in 2014. He presented with complaints of very typical chest pain. I set the patient up for a heart catheterization and his blood work came back showing he was significantly anemic. I admitted him to the hospital and he was found to have iron deficient anemia. GI was consulted. A colonoscopy showed a mass in his colon. The patient reports that the pathology came back as being precancerous, but it was oozing and likely the source of his bleeding. He did have an echocardiogram on 2014 which showed mild to moderate left ventricular hypertrophy with a hyperdynamic left ventricle without gradient. There was stage 1 diastolic dysfunction and no significant valvular heart disease.       After his blood transfusions, he had no further symptoms of chest pain. Prior to his surgery to remove the colon mass, he had a nuclear stress test  which did not show any evidence of ischemia. He was hospitalized to have a partial colectomy at the end of 2014. While in the hospital he had paroxysmal atrial fibrillation with a rapid ventricular response. He was initially treated with some amiodarone but discharged on only a beta blocker. He wore a 30-day event monitor after discharge from the hospital. This revealed one episode of atrial fibrillation with a rapid ventricular rate. I increased his Metoprolol at that time.      He was doing well until 2016. He was in [place]. He woke up around midnight with significant chest pain. He went to the hospital and was found to be in atrial fibrillation with rapid ventricular response. He reports they tried him on medication over the weekend but when he did  not convert, they did a cardioversion. He was started on propafenone and Eliquis and discharged. For some reason, he was taken off his metoprolol. He then became tachycardic and went back to see the doctor in [place] and they put him back on his metoprolol. He had an echo in 3/16 in [place]. It showed EF 55% to 60%, unable to accurately assess diastolic LV function, mildly dilated left atrium, mild tricuspid regurgitation, aortic valve leaflets mildly sclerotic, small pericardial effusion suspected and severe concentric left ventricular hypertrophy.       On April 11, 2016, he presented to the chest pain unit and was in atrial flutter with a rapid ventricular response. He was volume overloaded at that time and anemic. He was admitted to the hospital. His propafenone was discontinued. He spontaneously converted to sinus rhythm. Dr. Kunz saw him and recommended an atrial flutter ablation. Because of anemia, his Eliquis was discontinued. Dr. Kunz felt we could still be the flutter ablation as long as he was in sinus rhythm. However, when he saw Dr. Kunz on April 19 he was in atrial fibrillation. He underwent a colonoscopy and EGD while he was hospitalized. They saw a polyp which was removed. They saw some inflammation in the stomach.       I saw him back in May 2016 and at that time we resumed Eliquis. I repeated blood work and his hemoglobin was stable.      In August 2016, I set him up for an atrial flutter ablation. However, he was sick when he was supposed to have it done and canceled the procedure. He has been diagnosed with sleep apnea.  Initially, he was compliant with CPAP.  However, he got a series of upper respiratory tract infections which he linked to his CPAP device and so he returned it.     In 2017 he was complaining of increased shortness of breath.  I performed an echocardiogram on him which revealed normal LV systolic function, an ejection fraction of 59%, grade I diastolic dysfunction and  "no significant valvular heart disease.  He had a stress test which revealed normal LV systolic function, and no perfusion abnormalities.  He wore a Holter monitor for 24-hour and this was a normal study.        He has been feeling well. He had some shortness of breath associated with allergies, but that went away after a few days. He denies any chest pain or palpitations. He has stayed in [place] sheltering in place. He goes out for walks and has no exertional symptoms. He had an episode of bleeding last summer, but he is back on Eliquis, and his hemoglobin has been stable.          Objective:     Pulse 66   Ht 177.8 cm (70\")   Wt 104 kg (229 lb)   SpO2 97%   BMI 32.86 kg/m²  Body mass index is 32.86 kg/m².       Lab Review:  Reviewed CBCs      Assessment/Plan:       1. Atrial Fibrillation and Atrial Flutter  Assessment  • The patient has paroxysmal atrial fibrillation and persistent atrial flutter  • This is non-valvular in etiology  • The patient's CHADS2-VASc score is 2  • A [id number] score of 2 or more is considered a high risk for a thromboembolic event     Plan  • Continue in atrial fibrillation with rate control  • Continue apixaban for antithrombotic therapy, bleeding issues discussed  • Continue beta blocker for rate control  2. Diastolic heart failure secondary to arrhythmia.    3. Anemia. HGB 1/20 HGB stable.  4. Elevated liver enzymes. He follows this with Dr. Cheema.  5. Hyperlipidemia. He really needs diet and weight loss.    6. Obesity.   7. Obstructive sleep apnea.  He feels he cannot tolerate CPAP because it causes upper respiratory tract infections.     I will see him back in a year    You have chosen to receive care through a phone telemedicine visit today. Do you consent to use a video telemedicine visit for your medical care today? Yes.    Phone telemedicine visit lasted 8 minutes.    No orders of the defined types were placed in this encounter.       Discharge Medications           " Accurate as of April 20, 2020  2:30 PM. If you have any questions, ask your nurse or doctor.               Continue These Medications      Instructions Start Date   acetaminophen 325 MG tablet  Commonly known as:  TYLENOL   650 mg, Oral, Every 6 Hours PRN      albuterol 108 (90 Base) MCG/ACT inhaler  Commonly known as:  ProAir RespiClick   2 puffs, Inhalation, Every 4 Hours PRN      Eliquis 5 MG tablet tablet  Generic drug:  apixaban   TAKE 1 TABLET EVERY 12 HOURS      EQL Potassium Gluconate 595 (99 K) MG tablet tablet  Generic drug:  potassium gluconate   595 mg, Oral, Daily      ferrous sulfate 325 (65 FE) MG tablet   325 mg, Oral, 2 Times Daily      furosemide 40 MG tablet  Commonly known as:  LASIX   TAKE 1 TABLET DAILY      metoprolol succinate XL 50 MG 24 hr tablet  Commonly known as:  TOPROL-XL   150 mg, Oral, Daily      pantoprazole 40 MG EC tablet  Commonly known as:  PROTONIX   40 mg, Oral, Daily                    Whit Casiano MD  04/20/20  14:30

## 2020-04-20 NOTE — PROGRESS NOTES
palps no  SOA no  Edema yes occ left ankle  Lightheaded no  Chest pain no  Fatigue yes      Patient is in [place] and could not take BP. He states that he does not sleep well and wakes up around 3 am.

## 2020-04-30 RX ORDER — METOPROLOL SUCCINATE 50 MG/1
TABLET, EXTENDED RELEASE ORAL
Qty: 270 TABLET | Refills: 3 | Status: SHIPPED | OUTPATIENT
Start: 2020-04-30

## 2020-05-01 ENCOUNTER — TELEPHONE (OUTPATIENT)
Dept: GASTROENTEROLOGY | Facility: CLINIC | Age: 75
End: 2020-05-01

## 2020-05-13 LAB
BASOPHILS # BLD AUTO: 0.06 10*3/MM3 (ref 0–0.2)
BASOPHILS NFR BLD AUTO: 0.9 % (ref 0–1.5)
EOSINOPHIL # BLD AUTO: 0.09 10*3/MM3 (ref 0–0.4)
EOSINOPHIL NFR BLD AUTO: 1.4 % (ref 0.3–6.2)
ERYTHROCYTE [DISTWIDTH] IN BLOOD BY AUTOMATED COUNT: 13.2 % (ref 12.3–15.4)
HCT VFR BLD AUTO: 31.1 % (ref 37.5–51)
HGB BLD-MCNC: 9.9 G/DL (ref 13–17.7)
IMM GRANULOCYTES # BLD AUTO: 0.04 10*3/MM3 (ref 0–0.05)
IMM GRANULOCYTES NFR BLD AUTO: 0.6 % (ref 0–0.5)
LYMPHOCYTES # BLD AUTO: 1.14 10*3/MM3 (ref 0.7–3.1)
LYMPHOCYTES NFR BLD AUTO: 17.6 % (ref 19.6–45.3)
MCH RBC QN AUTO: 28.7 PG (ref 26.6–33)
MCHC RBC AUTO-ENTMCNC: 31.8 G/DL (ref 31.5–35.7)
MCV RBC AUTO: 90.1 FL (ref 79–97)
MONOCYTES # BLD AUTO: 0.41 10*3/MM3 (ref 0.1–0.9)
MONOCYTES NFR BLD AUTO: 6.3 % (ref 5–12)
NEUTROPHILS # BLD AUTO: 4.72 10*3/MM3 (ref 1.7–7)
NEUTROPHILS NFR BLD AUTO: 73.2 % (ref 42.7–76)
NRBC BLD AUTO-RTO: 0 /100 WBC (ref 0–0.2)
PLATELET # BLD AUTO: 215 10*3/MM3 (ref 140–450)
RBC # BLD AUTO: 3.45 10*6/MM3 (ref 4.14–5.8)
WBC # BLD AUTO: 6.46 10*3/MM3 (ref 3.4–10.8)

## 2020-05-15 ENCOUNTER — TELEPHONE (OUTPATIENT)
Dept: GASTROENTEROLOGY | Facility: CLINIC | Age: 75
End: 2020-05-15

## 2020-05-15 DIAGNOSIS — D50.9 IRON DEFICIENCY ANEMIA, UNSPECIFIED IRON DEFICIENCY ANEMIA TYPE: Primary | ICD-10-CM

## 2020-05-15 NOTE — TELEPHONE ENCOUNTER
Call to pt.  Advise per DR Alan note.  Verb understanding.     States no evidence of maureen blood.  Stools dark - on iron.     Lab appt scheduled for 6/15 @ 2 pm.  Order placed for cbc - message to DR Alan.

## 2020-05-15 NOTE — TELEPHONE ENCOUNTER
Hemoglobin down from last check 4 months ago - any visuallized bleeding? Continue iron - 4 week repeat cbc

## 2020-06-08 ENCOUNTER — RESULTS ENCOUNTER (OUTPATIENT)
Dept: GASTROENTEROLOGY | Facility: CLINIC | Age: 75
End: 2020-06-08

## 2020-06-08 DIAGNOSIS — D50.9 IRON DEFICIENCY ANEMIA, UNSPECIFIED IRON DEFICIENCY ANEMIA TYPE: ICD-10-CM

## 2020-06-11 RX ORDER — APIXABAN 5 MG/1
TABLET, FILM COATED ORAL
Qty: 180 TABLET | Refills: 3 | Status: SHIPPED | OUTPATIENT
Start: 2020-06-11

## 2020-06-15 ENCOUNTER — LAB (OUTPATIENT)
Dept: GASTROENTEROLOGY | Facility: CLINIC | Age: 75
End: 2020-06-15

## 2020-06-16 ENCOUNTER — TELEPHONE (OUTPATIENT)
Dept: GASTROENTEROLOGY | Facility: CLINIC | Age: 75
End: 2020-06-16

## 2020-06-16 DIAGNOSIS — D50.9 IRON DEFICIENCY ANEMIA, UNSPECIFIED IRON DEFICIENCY ANEMIA TYPE: Primary | ICD-10-CM

## 2020-06-16 LAB
BASOPHILS # BLD AUTO: 0.05 10*3/MM3 (ref 0–0.2)
BASOPHILS NFR BLD AUTO: 0.8 % (ref 0–1.5)
EOSINOPHIL # BLD AUTO: 0.08 10*3/MM3 (ref 0–0.4)
EOSINOPHIL NFR BLD AUTO: 1.2 % (ref 0.3–6.2)
ERYTHROCYTE [DISTWIDTH] IN BLOOD BY AUTOMATED COUNT: 13.4 % (ref 12.3–15.4)
HCT VFR BLD AUTO: 31.5 % (ref 37.5–51)
HGB BLD-MCNC: 9.9 G/DL (ref 13–17.7)
IMM GRANULOCYTES # BLD AUTO: 0.03 10*3/MM3 (ref 0–0.05)
IMM GRANULOCYTES NFR BLD AUTO: 0.5 % (ref 0–0.5)
LYMPHOCYTES # BLD AUTO: 0.95 10*3/MM3 (ref 0.7–3.1)
LYMPHOCYTES NFR BLD AUTO: 14.7 % (ref 19.6–45.3)
MCH RBC QN AUTO: 27.7 PG (ref 26.6–33)
MCHC RBC AUTO-ENTMCNC: 31.4 G/DL (ref 31.5–35.7)
MCV RBC AUTO: 88.2 FL (ref 79–97)
MONOCYTES # BLD AUTO: 0.47 10*3/MM3 (ref 0.1–0.9)
MONOCYTES NFR BLD AUTO: 7.3 % (ref 5–12)
NEUTROPHILS # BLD AUTO: 4.88 10*3/MM3 (ref 1.7–7)
NEUTROPHILS NFR BLD AUTO: 75.5 % (ref 42.7–76)
NRBC BLD AUTO-RTO: 0 /100 WBC (ref 0–0.2)
PLATELET # BLD AUTO: 195 10*3/MM3 (ref 140–450)
RBC # BLD AUTO: 3.57 10*6/MM3 (ref 4.14–5.8)
WBC # BLD AUTO: 6.46 10*3/MM3 (ref 3.4–10.8)

## 2020-06-16 NOTE — TELEPHONE ENCOUNTER
Call to pt.  Advise per Dr Alan note.  Verb understanding.     Lab appt scheduled for 8/17 @ 2pm.  Order placed for cbc - message to Dr Alan.

## 2020-06-18 RX ORDER — PANTOPRAZOLE SODIUM 40 MG/1
TABLET, DELAYED RELEASE ORAL
Qty: 90 TABLET | Refills: 3 | Status: SHIPPED | OUTPATIENT
Start: 2020-06-18

## 2020-06-22 ENCOUNTER — PATIENT OUTREACH (OUTPATIENT)
Dept: CASE MANAGEMENT | Facility: OTHER | Age: 75
End: 2020-06-22

## 2020-06-22 NOTE — OUTREACH NOTE
LVM for pt to return call to schedule AWMARGIE Duffy  [facility] Clinical Coordinator  [phone]

## 2020-06-23 NOTE — OUTREACH NOTE
LVM for pt to return call to schedule AWMARGIE Duffy  [facility] Clinical Coordinator  [phone]

## 2020-08-10 ENCOUNTER — RESULTS ENCOUNTER (OUTPATIENT)
Dept: GASTROENTEROLOGY | Facility: CLINIC | Age: 75
End: 2020-08-10

## 2020-08-10 DIAGNOSIS — D50.9 IRON DEFICIENCY ANEMIA, UNSPECIFIED IRON DEFICIENCY ANEMIA TYPE: ICD-10-CM

## 2020-08-17 ENCOUNTER — OFFICE VISIT (OUTPATIENT)
Dept: FAMILY MEDICINE CLINIC | Facility: CLINIC | Age: 75
End: 2020-08-17

## 2020-08-17 VITALS
DIASTOLIC BLOOD PRESSURE: 66 MMHG | OXYGEN SATURATION: 98 % | BODY MASS INDEX: 33.79 KG/M2 | WEIGHT: 236 LBS | TEMPERATURE: 99.1 F | SYSTOLIC BLOOD PRESSURE: 126 MMHG | HEART RATE: 73 BPM | HEIGHT: 70 IN

## 2020-08-17 DIAGNOSIS — I10 HYPERTENSION, ESSENTIAL: ICD-10-CM

## 2020-08-17 DIAGNOSIS — D50.9 IRON DEFICIENCY ANEMIA, UNSPECIFIED IRON DEFICIENCY ANEMIA TYPE: ICD-10-CM

## 2020-08-17 DIAGNOSIS — K92.2 UGI BLEED: ICD-10-CM

## 2020-08-17 DIAGNOSIS — R73.09 ELEVATED GLUCOSE: ICD-10-CM

## 2020-08-17 DIAGNOSIS — E78.01 FAMILIAL HYPERCHOLESTEROLEMIA: ICD-10-CM

## 2020-08-17 DIAGNOSIS — Z00.00 MEDICARE ANNUAL WELLNESS VISIT, SUBSEQUENT: Primary | ICD-10-CM

## 2020-08-17 DIAGNOSIS — R35.1 NOCTURIA: ICD-10-CM

## 2020-08-17 PROCEDURE — G0439 PPPS, SUBSEQ VISIT: HCPCS | Performed by: FAMILY MEDICINE

## 2020-08-17 PROCEDURE — 99214 OFFICE O/P EST MOD 30 MIN: CPT | Performed by: FAMILY MEDICINE

## 2020-08-17 RX ORDER — BENZONATATE 100 MG/1
200 CAPSULE ORAL 3 TIMES DAILY PRN
Qty: 100 CAPSULE | Refills: 3 | Status: SHIPPED | OUTPATIENT
Start: 2020-08-17

## 2020-08-17 NOTE — PROGRESS NOTES
The ABCs of the Annual Wellness Visit  Subsequent Medicare Wellness Visit    Chief Complaint   Patient presents with   • Medicare Wellness-subsequent     pt is here for SM[facility]       Subjective   History of Present Illness:  Henri Santana is a 75 y.o. male who presents for a Subsequent Medicare Wellness Visit.    Patient also here to follow-up on upper GI bleed, iron deficiency anemia, hyperlipidemia, hypertension, nocturia, and elevated glucose levels.  Taking tolerating Eliquis, furosemide, metoprolol, pantoprazole.  In need of blood work.    HEALTH RISK ASSESSMENT    Recent Hospitalizations:  Recently treated at the following:  [facility]    Current Medical Providers:  Patient Care Team:  Loki Diane MD as PCP - General (Family Medicine)  Whit Casiano MD as Consulting Physician (Cardiology)  Hoa Alan MD as Consulting Physician (Gastroenterology)    Smoking Status:  Social History     Tobacco Use   Smoking Status Former Smoker   • Packs/day: 1.00   • Years: 30.00   • Pack years: 30.00   • Types: Cigarettes   • Last attempt to quit: 1967   • Years since quittin.6   Smokeless Tobacco Former User   Tobacco Comment    caffeine use       Alcohol Consumption:  Social History     Substance and Sexual Activity   Alcohol Use No   • Frequency: Never       Depression Screen:   PHQ-2/PHQ-9 Depression Screening 2020   Little interest or pleasure in doing things 0   Feeling down, depressed, or hopeless 0   Trouble falling or staying asleep, or sleeping too much -   Feeling tired or having little energy -   Poor appetite or overeating -   Feeling bad about yourself - or that you are a failure or have let yourself or your family down -   Trouble concentrating on things, such as reading the newspaper or watching television -   Moving or speaking so slowly that other people could have noticed. Or the opposite - being so fidgety or restless that you have been moving around a lot more than usual  -   Thoughts that you would be better off dead, or of hurting yourself in some way -   Total Score 0   If you checked off any problems, how difficult have these problems made it for you to do your work, take care of things at home, or get along with other people? -       Fall Risk Screen:  JAMILAH Fall Risk Assessment has not been completed.    Health Habits and Functional and Cognitive Screening:  Functional & Cognitive Status 8/17/2020   Do you have difficulty preparing food and eating? No   Do you have difficulty bathing yourself, getting dressed or grooming yourself? No   Do you have difficulty using the toilet? No   Do you have difficulty moving around from place to place? No   Do you have trouble with steps or getting out of a bed or a chair? No   Current Diet Well Balanced Diet   Dental Exam Not up to date   Eye Exam Not up to date   Exercise (times per week) 4 times per week   Current Exercise Activities Include Walking   Do you need help using the phone?  No   Are you deaf or do you have serious difficulty hearing?  Yes   Do you need help with transportation? No   Do you need help shopping? No   Do you need help preparing meals?  No   Do you need help with housework?  No   Do you need help with laundry? No   Do you need help taking your medications? No   Do you need help managing money? No   Do you ever drive or ride in a car without wearing a seat belt? No   Have you felt unusual stress, anger or loneliness in the last month? No   Who do you live with? Spouse   If you need help, do you have trouble finding someone available to you? No   Have you been bothered in the last four weeks by sexual problems? No   Do you have difficulty concentrating, remembering or making decisions? No         Does the patient have evidence of cognitive impairment? No    Asprin use counseling:Does not need ASA (and currently is not on it)    Age-appropriate Screening Schedule:  Refer to the list below for future screening  recommendations based on patient's age, sex and/or medical conditions. Orders for these recommended tests are listed in the plan section. The patient has been provided with a written plan.    Health Maintenance   Topic Date Due   • TDAP/TD VACCINES (1 - Tdap) 02/26/1958   • ZOSTER VACCINE (1 of 2) 02/26/1997   • INFLUENZA VACCINE  08/01/2020   • LIPID PANEL  08/17/2021   • COLONOSCOPY  06/07/2024          The following portions of the patient's history were reviewed and updated as appropriate: allergies, current medications, past family history, past medical history, past social history, past surgical history and problem list.    Outpatient Medications Prior to Visit   Medication Sig Dispense Refill   • acetaminophen (TYLENOL) 325 MG tablet Take 650 mg by mouth Every 6 (Six) Hours As Needed for Mild Pain .     • albuterol (PROAIR RESPICLICK) 108 (90 Base) MCG/ACT inhaler Inhale 2 puffs Every 4 (Four) Hours As Needed for Wheezing or Shortness of Air. 1 inhaler 11   • ELIQUIS 5 MG tablet tablet TAKE 1 TABLET EVERY 12 HOURS 180 tablet 3   • ferrous sulfate 325 (65 FE) MG tablet Take 325 mg by mouth 2 (Two) Times a Day.     • furosemide (LASIX) 40 MG tablet TAKE 1 TABLET DAILY 90 tablet 3   • metoprolol succinate XL (TOPROL-XL) 50 MG 24 hr tablet TAKE 3 TABLETS DAILY 270 tablet 3   • pantoprazole (PROTONIX) 40 MG EC tablet TAKE 1 TABLET DAILY 90 tablet 3   • potassium gluconate (EQL POTASSIUM GLUCONATE) 595 MG tablet tablet Take 595 mg by mouth daily.       No facility-administered medications prior to visit.        Patient Active Problem List   Diagnosis   • Benign essential HTN   • Acid reflux   • Blood glucose elevated   • HLD (hyperlipidemia)   • CA of prostate (CMS/HCC)   • Class 2 obesity due to excess calories with serious comorbidity in adult   • Atrial flutter (CMS/HCC)   • Atrial fibrillation (CMS/HCC)   • Hypersomnolence   • Obstructive sleep apnea   • Anemia   • History of anemia   • Acute URI   • UGI bleed  "  • Symptomatic anemia   • Chest pain, atypical   • Chronic anticoagulation   • Chronic diastolic CHF (congestive heart failure) (CMS/HCC)   • Gastroesophageal reflux disease   • Iron deficiency anemia due to chronic blood loss   • Hemorrhagic disorder due to extrinsic circulating anticoagulants (CMS/HCC)   • Acute posthemorrhagic anemia   • Lipoma of intra-abdominal organs   • Gastric polyp   • Second degree hemorrhoids   • Diverticulosis of large intestine without diverticulitis       Advanced Care Planning:  ACP discussion was held with the patient during this visit. Patient does not have an advance directive, information provided.    Review of Systems   Constitutional: Negative.    HENT: Negative.    Eyes: Negative.    Respiratory: Negative.    Cardiovascular: Negative.  Negative for chest pain, palpitations and leg swelling.   Gastrointestinal: Negative.    Endocrine: Negative.    Genitourinary: Negative.    Musculoskeletal: Negative.    Skin: Negative.    Allergic/Immunologic: Negative.    Neurological: Negative.    Hematological: Negative.    Psychiatric/Behavioral: Negative.    All other systems reviewed and are negative.      Compared to one year ago, the patient feels his physical health is better.  Compared to one year ago, the patient feels his mental health is the same.    Reviewed chart for potential of high risk medication in the elderly: yes  Reviewed chart for potential of harmful drug interactions in the elderly:yes    Objective         Vitals:    08/17/20 1514   BP: 126/66   BP Location: Left arm   Patient Position: Sitting   Pulse: 73   Temp: 99.1 °F (37.3 °C)   SpO2: 98%   Weight: 107 kg (236 lb)   Height: 177.8 cm (70\")       Body mass index is 33.86 kg/m².  Discussed the patient's BMI with him. The BMI is above average; BMI management plan is completed.    Physical Exam   Constitutional: He is oriented to person, place, and time. He appears well-developed and well-nourished. No distress. "   Neurological: He is alert and oriented to person, place, and time.   Skin: He is not diaphoretic.   Psychiatric: He has a normal mood and affect. His behavior is normal.   Nursing note and vitals reviewed.      Lab Results   Component Value Date     (H) 08/17/2020    CHLPL 126 08/17/2020    TRIG 176 (H) 08/17/2020    HDL 33 (L) 08/17/2020    LDL 58 08/17/2020    VLDL 35.2 08/17/2020    HGBA1C 5.20 08/17/2020        Assessment/Plan   Medicare Risks and Personalized Health Plan  CMS Preventative Services Quick Reference  Advance Directive Discussion  Cardiovascular risk  Diabetic Lab Screening   Obesity/Overweight   Prostate Cancer Screening     The above risks/problems have been discussed with the patient.  Pertinent information has been shared with the patient in the After Visit Summary.  Follow up plans and orders are seen below in the Assessment/Plan Section.    Diagnoses and all orders for this visit:    1. Medicare annual wellness visit, subsequent (Primary)  -     Comprehensive Metabolic Panel  -     Hemoglobin A1c  -     Lipid Panel  -     CBC Auto Differential  -     Iron and TIBC  -     PSA DIAGNOSTIC    2. UGI bleed  -     Comprehensive Metabolic Panel  -     Hemoglobin A1c  -     Lipid Panel  -     CBC Auto Differential  -     Iron and TIBC  -     PSA DIAGNOSTIC    3. Iron deficiency anemia, unspecified iron deficiency anemia type  -     Comprehensive Metabolic Panel  -     Hemoglobin A1c  -     Lipid Panel  -     CBC Auto Differential  -     Iron and TIBC  -     PSA DIAGNOSTIC    4. Familial hypercholesterolemia  -     Comprehensive Metabolic Panel  -     Hemoglobin A1c  -     Lipid Panel  -     CBC Auto Differential  -     Iron and TIBC  -     PSA DIAGNOSTIC    5. Hypertension, essential  -     Comprehensive Metabolic Panel  -     Hemoglobin A1c  -     Lipid Panel  -     CBC Auto Differential  -     Iron and TIBC  -     PSA DIAGNOSTIC    6. Nocturia  -     Comprehensive Metabolic Panel  -      Hemoglobin A1c  -     Lipid Panel  -     CBC Auto Differential  -     Iron and TIBC  -     PSA DIAGNOSTIC    7. Elevated glucose  -     Comprehensive Metabolic Panel  -     Hemoglobin A1c  -     Lipid Panel  -     CBC Auto Differential  -     Iron and TIBC  -     PSA DIAGNOSTIC    Other orders  -     benzonatate (TESSALON) 100 MG capsule; Take 2 capsules by mouth 3 (Three) Times a Day As Needed for Cough.  Dispense: 100 capsule; Refill: 3  -     CBC & Differential    Labs and refills as above.  Follow Up:  Return for As needed.     An After Visit Summary and PPPS were given to the patient.

## 2020-08-18 LAB
ALBUMIN SERPL-MCNC: 4.2 G/DL (ref 3.5–5.2)
ALBUMIN/GLOB SERPL: 1.8 G/DL
ALP SERPL-CCNC: 113 U/L (ref 39–117)
ALT SERPL-CCNC: 22 U/L (ref 1–41)
AST SERPL-CCNC: 37 U/L (ref 1–40)
BASOPHILS # BLD AUTO: 0.07 10*3/MM3 (ref 0–0.2)
BASOPHILS NFR BLD AUTO: 1.1 % (ref 0–1.5)
BILIRUB SERPL-MCNC: 0.9 MG/DL (ref 0–1.2)
BUN SERPL-MCNC: 15 MG/DL (ref 8–23)
BUN/CREAT SERPL: 15.2 (ref 7–25)
CALCIUM SERPL-MCNC: 9.2 MG/DL (ref 8.6–10.5)
CHLORIDE SERPL-SCNC: 106 MMOL/L (ref 98–107)
CHOLEST SERPL-MCNC: 126 MG/DL (ref 0–200)
CO2 SERPL-SCNC: 27.2 MMOL/L (ref 22–29)
CREAT SERPL-MCNC: 0.99 MG/DL (ref 0.76–1.27)
EOSINOPHIL # BLD AUTO: 0.08 10*3/MM3 (ref 0–0.4)
EOSINOPHIL NFR BLD AUTO: 1.2 % (ref 0.3–6.2)
ERYTHROCYTE [DISTWIDTH] IN BLOOD BY AUTOMATED COUNT: 13.8 % (ref 12.3–15.4)
GLOBULIN SER CALC-MCNC: 2.3 GM/DL
GLUCOSE SERPL-MCNC: 134 MG/DL (ref 65–99)
HBA1C MFR BLD: 5.2 % (ref 4.8–5.6)
HCT VFR BLD AUTO: 31.7 % (ref 37.5–51)
HDLC SERPL-MCNC: 33 MG/DL (ref 40–60)
HGB BLD-MCNC: 10.1 G/DL (ref 13–17.7)
IMM GRANULOCYTES # BLD AUTO: 0.03 10*3/MM3 (ref 0–0.05)
IMM GRANULOCYTES NFR BLD AUTO: 0.5 % (ref 0–0.5)
IRON SATN MFR SERPL: 29 % (ref 20–50)
IRON SERPL-MCNC: 143 MCG/DL (ref 59–158)
LDLC SERPL CALC-MCNC: 58 MG/DL (ref 0–100)
LYMPHOCYTES # BLD AUTO: 0.8 10*3/MM3 (ref 0.7–3.1)
LYMPHOCYTES NFR BLD AUTO: 12.3 % (ref 19.6–45.3)
MCH RBC QN AUTO: 27.3 PG (ref 26.6–33)
MCHC RBC AUTO-ENTMCNC: 31.9 G/DL (ref 31.5–35.7)
MCV RBC AUTO: 85.7 FL (ref 79–97)
MONOCYTES # BLD AUTO: 0.4 10*3/MM3 (ref 0.1–0.9)
MONOCYTES NFR BLD AUTO: 6.1 % (ref 5–12)
NEUTROPHILS # BLD AUTO: 5.13 10*3/MM3 (ref 1.7–7)
NEUTROPHILS NFR BLD AUTO: 78.8 % (ref 42.7–76)
NRBC BLD AUTO-RTO: 0 /100 WBC (ref 0–0.2)
PLATELET # BLD AUTO: 210 10*3/MM3 (ref 140–450)
POTASSIUM SERPL-SCNC: 3.7 MMOL/L (ref 3.5–5.2)
PROT SERPL-MCNC: 6.5 G/DL (ref 6–8.5)
PSA SERPL-MCNC: 0.02 NG/ML (ref 0–4)
RBC # BLD AUTO: 3.7 10*6/MM3 (ref 4.14–5.8)
SODIUM SERPL-SCNC: 145 MMOL/L (ref 136–145)
TIBC SERPL-MCNC: 486 MCG/DL
TRIGL SERPL-MCNC: 176 MG/DL (ref 0–150)
UIBC SERPL-MCNC: 343 MCG/DL (ref 112–346)
VLDLC SERPL CALC-MCNC: 35.2 MG/DL
WBC # BLD AUTO: 6.51 10*3/MM3 (ref 3.4–10.8)

## 2020-08-19 ENCOUNTER — TELEPHONE (OUTPATIENT)
Dept: FAMILY MEDICINE CLINIC | Facility: CLINIC | Age: 75
End: 2020-08-19

## 2020-08-19 NOTE — TELEPHONE ENCOUNTER
PT STATES THAT HE HAD LABS DRAWN ON Monday AUG 17 AND WANTS TO KNOW IF THE RESULTS ARE IN YET.    PT CALL BACK  [phone]

## 2020-08-20 NOTE — PATIENT INSTRUCTIONS
Medicare Wellness  Personal Prevention Plan of Service     Date of Office Visit:  2020  Encounter Provider:  Loki Diane MD  Place of Service:  [facility]  Patient Name: Henri Santana  :  1945    As part of the Medicare Wellness portion of your visit today, we are providing you with this personalized preventive plan of services (PPPS). This plan is based upon recommendations of the [place] Preventive Services Task Force (USPSTF) and the Advisory Committee on Immunization Practices (ACIP).    This lists the preventive care services that should be considered, and provides dates of when you are due. Items listed as completed are up-to-date and do not require any further intervention.    Health Maintenance   Topic Date Due   • TDAP/TD VACCINES (1 - Tdap) 1958   • ZOSTER VACCINE (1 of 2) 1997   • Pneumococcal Vaccine Once at 65 Years Old  2012   • HEPATITIS C SCREENING  2017   • MEDICARE ANNUAL WELLNESS  2020   • INFLUENZA VACCINE  2020   • LIPID PANEL  2021   • COLONOSCOPY  2024   • AAA SCREEN (ONE-TIME)  Completed       Orders Placed This Encounter   Procedures   • Comprehensive Metabolic Panel     Order Specific Question:   [facility] Has the patient fasted?     Answer:   Yes   • Hemoglobin A1c     Order Specific Question:   [facility] Has the patient fasted?     Answer:   Yes   • Lipid Panel     Order Specific Question:   [facility] Has the patient fasted?     Answer:   Yes   • CBC Auto Differential   • Iron and TIBC     Order Specific Question:   [facility] Has the patient fasted?     Answer:   Yes   • PSA DIAGNOSTIC     Order Specific Question:   [facility] Has the patient fasted?     Answer:   Yes   • CBC & Differential     Order Specific Question:   [facility] Has the patient fasted?     Answer:   Yes       Return for As needed.

## 2020-11-05 ENCOUNTER — OFFICE VISIT (OUTPATIENT)
Dept: GASTROENTEROLOGY | Facility: CLINIC | Age: 75
End: 2020-11-05

## 2020-11-05 VITALS — HEIGHT: 70 IN | TEMPERATURE: 97.1 F | WEIGHT: 236.8 LBS | BODY MASS INDEX: 33.9 KG/M2

## 2020-11-05 DIAGNOSIS — K92.2 UGI BLEED: Primary | ICD-10-CM

## 2020-11-05 DIAGNOSIS — R04.0 BLEEDING FROM THE NOSE: ICD-10-CM

## 2020-11-05 DIAGNOSIS — D50.0 IRON DEFICIENCY ANEMIA DUE TO CHRONIC BLOOD LOSS: ICD-10-CM

## 2020-11-05 DIAGNOSIS — K55.20 AVM (ARTERIOVENOUS MALFORMATION) OF SMALL BOWEL, ACQUIRED: ICD-10-CM

## 2020-11-05 PROCEDURE — 99214 OFFICE O/P EST MOD 30 MIN: CPT | Performed by: NURSE PRACTITIONER

## 2020-11-05 NOTE — PROGRESS NOTES
Chief Complaint   Patient presents with   • Anemia       Henri Santana is a  75 y.o. male here for a follow up visit for anemia.    HPI  75-year-old male presents today for follow-up visit for iron deficiency anemia.  He is a patient of Dr. Alan.  He was last seen in the office on 9/5/2017.  He does have a history previously of an upper GI bleed with melena and iron deficiency anemia.  He had a capsule study done that showed proximal small bowel AVMs.  At that time the patient's hemoglobin was stable and he was no longer having melena so he decided to not pursue another procedure to have them cauterized.  At this point he is on iron supplementation.  He tells me his stools are always dark.  He has not seen any rectal bleeding.  He tells me he is tired all the time.  His most recent hemoglobin was lower at 9.6 on 10/20/2020.  He tells me lately what is been bothering him is nodes bleeds.  He does have a history of A. fib and is on Coumadin.  He tells me he has had several nosebleeds lately and has not been able to get into ear nose and throat to get it fixed.  He wonders how much blood he is losing through his nose.  He denies any dysphagia, reflux, abdominal pain, nausea vomiting, diarrhea, constipation or melena.  Admits his appetite is okay and his weight is stable.Last EGD and colonoscopy was on 6/2019.  He does have a history of tubular adenomas.  Past Medical History:   Diagnosis Date   • Abdominal bloating    • Abnormal weight loss    • Anemia     blood loss   • Atrial fibrillation (CMS/HCC)    • Atrial flutter (CMS/HCC)     persistent   • Benign essential hypertension    • Chest pain    • Colon polyps 11/28/2014, 12/23/2014, 04/14/2016, 06/07/2019   • Cough    • GERD (gastroesophageal reflux disease)    • Hyperglycemia    • Hyperglyceridemia    • Hyperlipidemia    • Hypertension    • Malignant neoplasm of prostate (CMS/HCC)    • Nephrolithiasis    • Obesity    • PAF (paroxysmal atrial fibrillation) (CMS/HCC)     • Prostate cancer (CMS/HCC)    • Sleep apnea    • SOB (shortness of breath)        Past Surgical History:   Procedure Laterality Date   • CARDIAC DEFIBRILLATOR PLACEMENT     • COLECTOMY PARTIAL / TOTAL Right 12/23/2014    Dr. Fabrice Scott, [facility]   • COLON RESECTION N/A 12/26/2014    Resection of leaking anastomosis-Dr. Fabrice Scott, [facility]   • COLONOSCOPY N/A 4/14/2016    Procedure: COLONOSCOPY TO ILEOCOLIC ANASTOMOSIS WITH POLYPECTOMY;  Surgeon: Geraldo Azul MD;  Location: [facility] ENDOSCOPY;  Service:    • COLONOSCOPY N/A 6/7/2019    IH, diverticulosis, one 5 mm polyp, anastomosis, characterized by erythema, Path: tubular adenoma   • COLONOSCOPY N/A 03/15/2005    Normal-Dr. Shailesh Sloan, [facility]   • COLONOSCOPY N/A 11/28/2014    Tumor in the cecum (fragments of tubulovillous adenoma with focal high grade dysplasia), one 3 mm hyperplastic polyp in the sigmoid colon, diverticulosis in the sigmoid colon, non-bleeding internal hemorrhoids-Dr. Hoa Alan, [facility]   • ENDOSCOPY  4/14/2016    Procedure: ESOPHAGOGASTRODUODENOSCOPY WITH BIOPSIES;  Surgeon: Geraldo Azul MD;  Location: [facility] ENDOSCOPY;  Service:    • ENDOSCOPY N/A 6/7/2019    Gastric lipoma, nodular mucosa in gastric body, no specimens collected   • ENDOSCOPY N/A 05/16/2003    Some evidence of Salazar's esophagitis-Dr. Fabrice Scott, [facility]   • MOUTH SURGERY  1992   • PROSTATE BIOPSY Bilateral    • PROSTATE SURGERY     • UPPER GASTROINTESTINAL ENDOSCOPY N/A 11/28/2014    Normal esophagus, single medium-sized papule with no bleeding and no stigmata of recent bleeding in the stomach, duodenitis-Dr. Hoa Alan, [facility]       Scheduled Meds:    Continuous Infusions:No current facility-administered medications for this visit.       PRN Meds:.    No Known Allergies    Social History     Socioeconomic History   • Marital status:      Spouse name: Not on file   • Number of children: Not on file   • Years of education: Not on file   • Highest education level:  Not on file   Tobacco Use   • Smoking status: Former Smoker     Packs/day: 1.00     Years: 30.00     Pack years: 30.00     Types: Cigarettes     Quit date:      Years since quittin.8   • Smokeless tobacco: Former User   • Tobacco comment: caffeine use   Substance and Sexual Activity   • Alcohol use: No     Frequency: Never   • Drug use: No   • Sexual activity: Defer       Family History   Problem Relation Age of Onset   • Cancer Other    • Hypertension Other    • Stroke Other    • Heart attack Other    • Heart disease Mother    • Hypertension Mother    • Diabetes Mother    • Cancer Mother    • Heart disease Father    • Stroke Father    • Hypertension Father    • Cancer Father        Review of Systems   Constitutional: Positive for fatigue. Negative for appetite change, chills, diaphoresis, fever and unexpected weight change.   HENT: Positive for nosebleeds. Negative for postnasal drip, sore throat, trouble swallowing and voice change.    Respiratory: Negative for cough, choking, chest tightness, shortness of breath and wheezing.    Cardiovascular: Negative for chest pain, palpitations and leg swelling.   Gastrointestinal: Negative for abdominal distention, abdominal pain, anal bleeding, blood in stool, constipation, diarrhea, nausea, rectal pain and vomiting.   Endocrine: Negative for polydipsia, polyphagia and polyuria.   Musculoskeletal: Negative for gait problem.   Skin: Negative for rash and wound.   Allergic/Immunologic: Negative for food allergies.   Neurological: Negative for dizziness, speech difficulty and light-headedness.   Psychiatric/Behavioral: Negative for confusion, self-injury, sleep disturbance and suicidal ideas.       Vitals:    20 1431   Temp: 97.1 °F (36.2 °C)       Physical Exam  Constitutional:       General: He is not in acute distress.     Appearance: He is well-developed. He is not ill-appearing.   HENT:      Head: Normocephalic.   Eyes:      Pupils: Pupils are equal, round,  and reactive to light.   Cardiovascular:      Rate and Rhythm: Normal rate and regular rhythm.      Heart sounds: Normal heart sounds.   Pulmonary:      Effort: Pulmonary effort is normal.      Breath sounds: Normal breath sounds.   Abdominal:      General: Bowel sounds are normal. There is no distension.      Palpations: Abdomen is soft. There is no mass.      Tenderness: There is no abdominal tenderness. There is no guarding or rebound.      Hernia: No hernia is present.   Musculoskeletal: Normal range of motion.   Skin:     General: Skin is warm and dry.   Neurological:      Mental Status: He is alert and oriented to person, place, and time.   Psychiatric:         Speech: Speech normal.         Behavior: Behavior normal.         Judgment: Judgment normal.         No radiology results for the last 7 days     Assessment and plan     1. UGI bleed  - CBC & Differential    2. Iron deficiency anemia due to chronic blood loss  - CBC & Differential    3. AVM (arteriovenous malformation) of small bowel, acquired  - CBC & Differential    4. Bleeding from the nose  - CBC & Differential    Reviewed most recent blood work with him today.  Hemoglobin is slightly lower than his normal at 9.6.  I am worried that these nosebleeds are causing a lot of problems.  With him being on a blood thinner I am quite worried.  I did go ahead make a referral to an ear nose and throat for him to get these nosebleeds taking care of.  Continued iron twice daily.  He needs to add vitamin C to it for better absorption.  Continue Protonix 40 mg once daily.  Continue GERD precautions.  No signs of GI bleeding noted.  Patient still does not want to have a small bowel enteroscopy to APC the AVMs that were found on the video capsule study at this time.  Continue to monitor hemoglobin closely.  Patient to call the office with any issues.  I did advise the patient if his nosebleeds were to get worse I want him to go to the [facility] for immediate  evaluation.  He agrees with the plan. Patient to follow-up with me in 3 to 4 weeks.

## 2020-11-06 ENCOUNTER — TELEPHONE (OUTPATIENT)
Dept: GASTROENTEROLOGY | Facility: CLINIC | Age: 75
End: 2020-11-06

## 2020-11-06 LAB
BASOPHILS # BLD AUTO: 0.07 10*3/MM3 (ref 0–0.2)
BASOPHILS NFR BLD AUTO: 1.2 % (ref 0–1.5)
EOSINOPHIL # BLD AUTO: 0.08 10*3/MM3 (ref 0–0.4)
EOSINOPHIL NFR BLD AUTO: 1.4 % (ref 0.3–6.2)
ERYTHROCYTE [DISTWIDTH] IN BLOOD BY AUTOMATED COUNT: 14.4 % (ref 12.3–15.4)
HCT VFR BLD AUTO: 26.7 % (ref 37.5–51)
HGB BLD-MCNC: 8.2 G/DL (ref 13–17.7)
IMM GRANULOCYTES # BLD AUTO: 0.02 10*3/MM3 (ref 0–0.05)
IMM GRANULOCYTES NFR BLD AUTO: 0.3 % (ref 0–0.5)
LYMPHOCYTES # BLD AUTO: 0.89 10*3/MM3 (ref 0.7–3.1)
LYMPHOCYTES NFR BLD AUTO: 15.3 % (ref 19.6–45.3)
MCH RBC QN AUTO: 27 PG (ref 26.6–33)
MCHC RBC AUTO-ENTMCNC: 30.7 G/DL (ref 31.5–35.7)
MCV RBC AUTO: 87.8 FL (ref 79–97)
MONOCYTES # BLD AUTO: 0.46 10*3/MM3 (ref 0.1–0.9)
MONOCYTES NFR BLD AUTO: 7.9 % (ref 5–12)
NEUTROPHILS # BLD AUTO: 4.3 10*3/MM3 (ref 1.7–7)
NEUTROPHILS NFR BLD AUTO: 73.9 % (ref 42.7–76)
NRBC BLD AUTO-RTO: 0 /100 WBC (ref 0–0.2)
PLATELET # BLD AUTO: 216 10*3/MM3 (ref 140–450)
RBC # BLD AUTO: 3.04 10*6/MM3 (ref 4.14–5.8)
WBC # BLD AUTO: 5.82 10*3/MM3 (ref 3.4–10.8)

## 2020-11-06 NOTE — TELEPHONE ENCOUNTER
Lets have him come by the office Monday and just  3 Hemoccult stool cards and check his stools at home for me. I want to make sure he is not having any GI bleeding.  Thanks

## 2020-11-06 NOTE — TELEPHONE ENCOUNTER
----- Message from SWEETIE Brownlee sent at 11/6/2020 12:36 PM EST -----  Please call the patient and let him know his hemoglobin is really dropped from 9.62 weeks ago to 8.2 today.  I really worried that he is having these really nasty nosebleeds and this could be contributing to his anemia.  Did he get an appointment with ear nose and throat yet?  Has he had any more nosebleeds?

## 2020-11-06 NOTE — TELEPHONE ENCOUNTER
Called pt and advised of Antonia's note. Pt verb understanding and reports he has not had any further nose bleeds.  Pt also reports that his appt with ENT is 11/11/2020.  Advised will update Antonia NP and in meantime advised pt if he feels, dizzy, lightheaded and fatigue he should go to ER.  Pt verb understanding.

## (undated) DEVICE — CANN NASL CO2 TRULINK W/O2 A/

## (undated) DEVICE — TUBING, SUCTION, 1/4" X 10', STRAIGHT: Brand: MEDLINE

## (undated) DEVICE — Device: Brand: DEFENDO AIR/WATER/SUCTION AND BIOPSY VALVE

## (undated) DEVICE — THE TORRENT IRRIGATION SCOPE CONNECTOR IS USED WITH THE TORRENT IRRIGATION TUBING TO PROVIDE IRRIGATION FLUIDS SUCH AS STERILE WATER DURING GASTROINTESTINAL ENDOSCOPIC PROCEDURES WHEN USED IN CONJUNCTION WITH AN IRRIGATION PUMP (OR ELECTROSURGICAL UNIT).: Brand: TORRENT

## (undated) DEVICE — SINGLE-USE POLYPECTOMY SNARE: Brand: CAPTIVATOR II

## (undated) DEVICE — THE SINGLE USE ETRAP – POLYP TRAP IS USED FOR SUCTION RETRIEVAL OF ENDOSCOPICALLY REMOVED POLYPS.: Brand: ETRAP

## (undated) DEVICE — BITEBLOCK OMNI BLOC